# Patient Record
Sex: FEMALE | Race: WHITE | Employment: UNEMPLOYED | URBAN - METROPOLITAN AREA
[De-identification: names, ages, dates, MRNs, and addresses within clinical notes are randomized per-mention and may not be internally consistent; named-entity substitution may affect disease eponyms.]

---

## 2019-09-19 ENCOUNTER — APPOINTMENT (OUTPATIENT)
Dept: GENERAL RADIOLOGY | Age: 54
End: 2019-09-19
Attending: PHYSICIAN ASSISTANT
Payer: COMMERCIAL

## 2019-09-19 ENCOUNTER — HOSPITAL ENCOUNTER (EMERGENCY)
Age: 54
Discharge: HOME | End: 2019-09-19
Attending: EMERGENCY MEDICINE
Payer: COMMERCIAL

## 2019-09-19 ENCOUNTER — OFFICE VISIT (OUTPATIENT)
Dept: ORTHOPEDIC SURGERY | Age: 54
End: 2019-09-19

## 2019-09-19 VITALS
DIASTOLIC BLOOD PRESSURE: 49 MMHG | OXYGEN SATURATION: 99 % | WEIGHT: 260 LBS | HEIGHT: 67 IN | BODY MASS INDEX: 40.81 KG/M2 | RESPIRATION RATE: 18 BRPM | SYSTOLIC BLOOD PRESSURE: 116 MMHG | HEART RATE: 59 BPM | TEMPERATURE: 98.9 F

## 2019-09-19 VITALS
BODY MASS INDEX: 41.44 KG/M2 | HEIGHT: 67 IN | RESPIRATION RATE: 16 BRPM | SYSTOLIC BLOOD PRESSURE: 189 MMHG | HEART RATE: 79 BPM | TEMPERATURE: 97.8 F | WEIGHT: 264 LBS | OXYGEN SATURATION: 94 % | DIASTOLIC BLOOD PRESSURE: 93 MMHG

## 2019-09-19 DIAGNOSIS — S52.522A CLOSED TORUS FRACTURE OF DISTAL END OF LEFT RADIUS, INITIAL ENCOUNTER: Primary | ICD-10-CM

## 2019-09-19 DIAGNOSIS — S52.601A CLOSED FRACTURE OF DISTAL END OF RIGHT ULNA, UNSPECIFIED FRACTURE MORPHOLOGY, INITIAL ENCOUNTER: ICD-10-CM

## 2019-09-19 DIAGNOSIS — E66.01 OBESITY, MORBID (HCC): ICD-10-CM

## 2019-09-19 DIAGNOSIS — S52.532A CLOSED COLLES' FRACTURE OF LEFT RADIUS, INITIAL ENCOUNTER: Primary | ICD-10-CM

## 2019-09-19 PROCEDURE — 99284 EMERGENCY DEPT VISIT MOD MDM: CPT

## 2019-09-19 PROCEDURE — 74011250636 HC RX REV CODE- 250/636: Performed by: PHYSICIAN ASSISTANT

## 2019-09-19 PROCEDURE — 90471 IMMUNIZATION ADMIN: CPT

## 2019-09-19 PROCEDURE — A4565 SLINGS: HCPCS

## 2019-09-19 PROCEDURE — 74011250637 HC RX REV CODE- 250/637: Performed by: PHYSICIAN ASSISTANT

## 2019-09-19 PROCEDURE — 73110 X-RAY EXAM OF WRIST: CPT

## 2019-09-19 PROCEDURE — 90715 TDAP VACCINE 7 YRS/> IM: CPT | Performed by: PHYSICIAN ASSISTANT

## 2019-09-19 PROCEDURE — 75810000053 HC SPLINT APPLICATION

## 2019-09-19 RX ORDER — ONDANSETRON 4 MG/1
4 TABLET, ORALLY DISINTEGRATING ORAL
Status: COMPLETED | OUTPATIENT
Start: 2019-09-19 | End: 2019-09-19

## 2019-09-19 RX ORDER — HYDROCODONE BITARTRATE AND ACETAMINOPHEN 5; 325 MG/1; MG/1
1 TABLET ORAL
Qty: 12 TAB | Refills: 0 | Status: SHIPPED | OUTPATIENT
Start: 2019-09-19 | End: 2019-09-22

## 2019-09-19 RX ORDER — HYDROCODONE BITARTRATE AND ACETAMINOPHEN 5; 325 MG/1; MG/1
1 TABLET ORAL
Status: COMPLETED | OUTPATIENT
Start: 2019-09-19 | End: 2019-09-19

## 2019-09-19 RX ORDER — ONDANSETRON 4 MG/1
4 TABLET, ORALLY DISINTEGRATING ORAL
Qty: 12 TAB | Refills: 0 | Status: SHIPPED | OUTPATIENT
Start: 2019-09-19 | End: 2022-07-25

## 2019-09-19 RX ADMIN — HYDROCODONE BITARTRATE AND ACETAMINOPHEN 1 TABLET: 5; 325 TABLET ORAL at 15:30

## 2019-09-19 RX ADMIN — TETANUS TOXOID, REDUCED DIPHTHERIA TOXOID AND ACELLULAR PERTUSSIS VACCINE, ADSORBED 0.5 ML: 5; 2.5; 8; 8; 2.5 SUSPENSION INTRAMUSCULAR at 15:29

## 2019-09-19 RX ADMIN — ONDANSETRON 4 MG: 4 TABLET, ORALLY DISINTEGRATING ORAL at 15:30

## 2019-09-19 NOTE — PROGRESS NOTES
Aranza Minor is a 53 y.o. female right handed individual, not currently working. Worker's Compensation and legal considerations: not known. Vitals:    09/19/19 1643   BP: (!) 189/93   Pulse: 79   Resp: 16   Temp: 97.8 °F (36.6 °C)   TempSrc: Oral   SpO2: 94%   Weight: 264 lb (119.7 kg)   Height: 5' 7\" (1.702 m)   PainSc:  10 - Worst pain ever   PainLoc: Wrist           Chief Complaint   Patient presents with    Wrist Pain     left wrist pain         HPI: Pt comes in today after falling this afternoon and injuring her left wrist.  She was splinted in ED and referred here    Date of onset:  9/19/2019    Injury: Yes: Comment: fall    Prior Treatment:  Yes: Comment: ED Splint    Numbness/ Tingling: No    ROS: Review of Systems - General ROS: negative  Respiratory ROS: no cough, shortness of breath, or wheezing  Cardiovascular ROS: no chest pain or dyspnea on exertion  Musculoskeletal ROS: positive for - pain in wrist - left  Neurological ROS: negative  Dermatological ROS: negative    History reviewed. No pertinent past medical history. History reviewed. No pertinent surgical history. Current Outpatient Medications   Medication Sig Dispense Refill    HYDROcodone-acetaminophen (NORCO) 5-325 mg per tablet Take 1 Tab by mouth every four (4) hours as needed for Pain for up to 3 days. Max Daily Amount: 6 Tabs. 12 Tab 0    ondansetron (ZOFRAN ODT) 4 mg disintegrating tablet Take 1 Tab by mouth every eight (8) hours as needed for Nausea. 12 Tab 0       No Known Allergies      PE:     Left Wrist:  Splint intact. Sensation intact. Cap refill brisk. Imaging:     ED Plain Films show displaced Left DR Fracture        ICD-10-CM ICD-9-CM    1. Closed Colles' fracture of left radius, initial encounter S52.532A 813.41    2.  Obesity, morbid ([facility] [address] E66.01 278.01        Plan:     ORIF Left Distal Radius on 9/24/2019    F/U 2 weeks PO    General and Block    Plan was reviewed with patient, who verbalized agreement and understanding of the plan

## 2019-09-19 NOTE — DISCHARGE INSTRUCTIONS
Patient Education        Broken Arm: Care Instructions  Your Care Instructions  Fractures can range from a small, hairline crack, to a bone or bones broken into two or more pieces. Your treatment depends on how bad the break is. Your doctor may have put your arm in a splint or cast to allow it to heal or to keep it stable until you see another doctor. It may take weeks or months for your arm to heal. You can help your arm heal with some care at home. You heal best when you take good care of yourself. Eat a variety of healthy foods, and don't smoke. You may have had a sedative to help you relax. You may be unsteady after having sedation. It can take a few hours for the medicine's effects to wear off. Common side effects of sedation include nausea, vomiting, and feeling sleepy or tired. The doctor has checked you carefully, but problems can develop later. If you notice any problems or new symptoms, get medical treatment right away. Follow-up care is a key part of your treatment and safety. Be sure to make and go to all appointments, and call your doctor if you are having problems. It's also a good idea to know your test results and keep a list of the medicines you take. How can you care for yourself at home? · If the doctor gave you a sedative:  ? For 24 hours, don't do anything that requires attention to detail, such as going to work, making important decisions, or signing any legal documents. It takes time for the medicine's effects to completely wear off.  ? For your safety, do not drive or operate any machinery that could be dangerous. Wait until the medicine wears off and you can think clearly and react easily. · Put ice or a cold pack on your arm for 10 to 20 minutes at a time. Try to do this every 1 to 2 hours for the next 3 days (when you are awake). Put a thin cloth between the ice and your cast or splint. Keep the cast or splint dry. · Follow the cast care instructions your doctor gives you.  If you have a splint, do not take it off unless your doctor tells you to. · Be safe with medicines. Take pain medicines exactly as directed. ? If the doctor gave you a prescription medicine for pain, take it as prescribed. ? If you are not taking a prescription pain medicine, ask your doctor if you can take an over-the-counter medicine. · Prop up your arm on pillows when you sit or lie down in the first few days after the injury. Keep the arm higher than the level of your heart. This will help reduce swelling. · Follow instructions for exercises to keep your arm strong. · Wiggle your fingers and wrist often to reduce swelling and stiffness. When should you call for help? Call 911 anytime you think you may need emergency care. For example, call if:    · You are very sleepy and you have trouble waking up.    Call your doctor now or seek immediate medical care if:    · You have new or worse nausea or vomiting.     · You have new or worse pain.     · Your hand or fingers are cool or pale or change color.     · Your cast or splint feels too tight.     · You have tingling, weakness, or numbness in your hand or fingers.    Watch closely for changes in your health, and be sure to contact your doctor if:    · You do not get better as expected.     · You have problems with your cast or splint. Where can you learn more? Go to http://tiffanie-audra.info/. Enter Z382 in the search box to learn more about \"Broken Arm: Care Instructions. \"  Current as of: September 20, 2018  Content Version: 12.1  © 4381-7075 [employer]. Care instructions adapted under license by [employer] (which disclaims liability or warranty for this information). If you have questions about a medical condition or this instruction, always ask your healthcare professional. [address] any warranty or liability for your use of this information.

## 2019-09-19 NOTE — PROGRESS NOTES
1. Have you been to the ER, urgent care clinic since your last visit? Hospitalized since your last visit? No    2. Have you seen or consulted any other health care providers outside of the [address] [place] [address] since your last visit? Include any pap smears or colon screening.  No

## 2019-09-19 NOTE — ED PROVIDER NOTES
EMERGENCY DEPARTMENT HISTORY AND PHYSICAL EXAM    Date: 9/19/2019  Patient Name: Frances Moser    History of Presenting Illness     Chief Complaint   Patient presents with    Wrist Pain    Leg Pain         History Provided By: Patient        Additional History (Context): Frances Moser is a 53 y.o. female with No significant past medical history who presents with a history of mechanical fall with fall on outstretched arms complains of 7 out of 10, moderate left wrist pain, a mild to moderate left shin bruise and a knee abrasion status post this fall. Patient specifically denies syncope. Patient tripped and then fell. Patient denies head neck or back injury no loss of consciousness. Patient denies numbness, weakness or paresthesias of the extremities. PCP: None    Current Outpatient Medications   Medication Sig Dispense Refill    HYDROcodone-acetaminophen (NORCO) 5-325 mg per tablet Take 1 Tab by mouth every four (4) hours as needed for Pain for up to 3 days. Max Daily Amount: 6 Tabs. 12 Tab 0    ondansetron (ZOFRAN ODT) 4 mg disintegrating tablet Take 1 Tab by mouth every eight (8) hours as needed for Nausea. 12 Tab 0       Past History     Past Medical History:  History reviewed. No pertinent past medical history. Past Surgical History:  History reviewed. No pertinent surgical history. Family History:  History reviewed. No pertinent family history. Social History:  Social History     Tobacco Use    Smoking status: Current Every Day Smoker    Smokeless tobacco: Never Used   Substance Use Topics    Alcohol use: Not on file    Drug use: Not on file       Allergies:  No Known Allergies      Review of Systems   Review of Systems  ROS  Review of Systems   Constitutional: Negative for fatigue and fever. HENT: Negative for congestion. Respiratory: Negative for cough and shortness of breath. Cardiovascular: Negative for chest pain.     Musculoskeletal: Positive for left wrist joint pain, with joint swelling, recent injury as described in HPI. Skin: Left knee skin abrasion. .   Neurological: Negative for dizziness and headaches. All other systems reviewed and are negative. All Other Systems Negative  Physical Exam     Vitals:    09/19/19 1513   BP: 116/49   Pulse: (!) 59   Resp: 18   Temp: 98.9 °F (37.2 °C)   SpO2: 99%   Weight: 117.9 kg (260 lb)   Height: 5' 7\" (1.702 m)     Physical Exam     Constitutional: Pt is oriented to person, place, and time. Pt appears well-developed and well-nourished. HENT:   Head: Normocephalic and atraumatic. Mouth/Throat: Oropharynx is clear and moist.   Eyes: Pupils are equal, round, and reactive to light. Neck: Normal range of motion. Neck supple. No tracheal deviation present. No thyromegaly present. Cardiovascular: Normal rate, regular rhythm and normal heart sounds. No murmur heard. Pulmonary/Chest: Effort normal and breath sounds normal. No respiratory distress. No wheezes or rales. Musculoskeletal: Range of motion deferred secondary to obvious deformity of the distal radius or ulna. Cap refill is intact less than 2 seconds pulses intact patient denies snuffbox tenderness. Neurological: Pt is alert and oriented to person, place, and time   Skin: There is a large hematoma on the left shin anteriorly, and a superficial abrasion of the left knee present. Psychiatric: Pt has a normal mood and affect;  behavior is normal. Judgment and thought content normal.           Diagnostic Study Results     Labs -   No results found for this or any previous visit (from the past 12 hour(s)). Radiologic Studies -   XR WRIST LT AP/LAT/OBL MIN 3V   Final Result   IMPRESSION:      1. Acute distal radial intraarticular apex palmar mildly angulated fracture   with mild dorsal tilt. 2.  Acute ulnar styloid fracture.                CT Results  (Last 48 hours)    None        CXR Results  (Last 48 hours)    None            Medical Decision Making   I am the first provider for this patient. I reviewed the vital signs, available nursing notes, past medical history, past surgical history, family history and social history. Vital Signs-Reviewed the patient's vital signs. Comparison:    Records Reviewed: Nursing Notes    Procedures:  Procedures    Provider Notes (Medical Decision Making):   Vascular intact status post sugar tong splint placement of sling as well. MED RECONCILIATION:  No current facility-administered medications for this encounter. Current Outpatient Medications   Medication Sig    HYDROcodone-acetaminophen (NORCO) 5-325 mg per tablet Take 1 Tab by mouth every four (4) hours as needed for Pain for up to 3 days. Max Daily Amount: 6 Tabs.  ondansetron (ZOFRAN ODT) 4 mg disintegrating tablet Take 1 Tab by mouth every eight (8) hours as needed for Nausea. Disposition:  Home    DISCHARGE NOTE:   Patient seen, examined and discharged from triage. Patient has no other complaints, changes, or physical findings. Care plan outlined and precautions discussed. Results of exam were reviewed with the patient. All medications were reviewed with the patient; will d/c home with follow up instructions. All of pt's questions and concerns were addressed. Patient was instructed and agrees to follow up with primary care, as well as to return to the ED upon further deterioration. Patient is ready to go home.       Follow-up Information     Follow up With Specialties Details Why [address] [place] [id number]  [phone] [address] [facility] EMERGENCY DEPT Emergency Medicine  If symptoms worsen [address] [facility]  [phone]          Current Discharge Medication List      START taking these medications    Details   HYDROcodone-acetaminophen (NORCO) 5-325 mg per tablet Take 1 Tab by mouth every four (4) hours as needed for Pain for up to 3 days. Max Daily Amount: 6 Tabs. Qty: 12 Tab, Refills: 0    Associated Diagnoses: Closed torus fracture of distal end of left radius, initial encounter; Closed fracture of distal end of right ulna, unspecified fracture morphology, initial encounter      ondansetron (ZOFRAN ODT) 4 mg disintegrating tablet Take 1 Tab by mouth every eight (8) hours as needed for Nausea. Qty: 12 Tab, Refills: 0                 Diagnosis     Clinical Impression:   1. Closed torus fracture of distal end of left radius, initial encounter    2.  Closed fracture of distal end of right ulna, unspecified fracture morphology, initial encounter

## 2019-09-20 ENCOUNTER — TELEPHONE (OUTPATIENT)
Dept: ORTHOPEDIC SURGERY | Age: 54
End: 2019-09-20

## 2019-09-20 ENCOUNTER — HOSPITAL ENCOUNTER (OUTPATIENT)
Dept: LAB | Age: 54
Discharge: HOME | End: 2019-09-20
Payer: COMMERCIAL

## 2019-09-20 DIAGNOSIS — Z01.818 PREOP EXAMINATION: Primary | ICD-10-CM

## 2019-09-20 DIAGNOSIS — Z01.818 PREOP EXAMINATION: ICD-10-CM

## 2019-09-20 LAB
ALBUMIN SERPL-MCNC: 3.7 G/DL (ref 3.4–5)
ALBUMIN/GLOB SERPL: 1.2 {RATIO} (ref 0.8–1.7)
ALP SERPL-CCNC: 119 U/L (ref 45–117)
ALT SERPL-CCNC: 23 U/L (ref 13–56)
ANION GAP SERPL CALC-SCNC: 9 MMOL/L (ref 3–18)
AST SERPL-CCNC: 11 U/L (ref 10–38)
BASOPHILS # BLD: 0 K/UL (ref 0–0.1)
BASOPHILS NFR BLD: 0 % (ref 0–2)
BILIRUB SERPL-MCNC: 0.5 MG/DL (ref 0.2–1)
BUN SERPL-MCNC: 21 MG/DL (ref 7–18)
BUN/CREAT SERPL: 30 (ref 12–20)
CALCIUM SERPL-MCNC: 8.7 MG/DL (ref 8.5–10.1)
CHLORIDE SERPL-SCNC: 111 MMOL/L (ref 100–111)
CO2 SERPL-SCNC: 24 MMOL/L (ref 21–32)
CREAT SERPL-MCNC: 0.7 MG/DL (ref 0.6–1.3)
DIFFERENTIAL METHOD BLD: ABNORMAL
EOSINOPHIL # BLD: 0.2 K/UL (ref 0–0.4)
EOSINOPHIL NFR BLD: 2 % (ref 0–5)
ERYTHROCYTE [DISTWIDTH] IN BLOOD BY AUTOMATED COUNT: 14.6 % (ref 11.6–14.5)
GLOBULIN SER CALC-MCNC: 3 G/DL (ref 2–4)
GLUCOSE SERPL-MCNC: 109 MG/DL (ref 74–99)
HCT VFR BLD AUTO: 41.1 % (ref 35–45)
HGB BLD-MCNC: 13.4 G/DL (ref 12–16)
LYMPHOCYTES # BLD: 3.8 K/UL (ref 0.9–3.6)
LYMPHOCYTES NFR BLD: 33 % (ref 21–52)
MCH RBC QN AUTO: 30.4 PG (ref 24–34)
MCHC RBC AUTO-ENTMCNC: 32.6 G/DL (ref 31–37)
MCV RBC AUTO: 93.2 FL (ref 74–97)
MONOCYTES # BLD: 1.1 K/UL (ref 0.05–1.2)
MONOCYTES NFR BLD: 9 % (ref 3–10)
NEUTS SEG # BLD: 6.3 K/UL (ref 1.8–8)
NEUTS SEG NFR BLD: 56 % (ref 40–73)
PLATELET # BLD AUTO: 377 K/UL (ref 135–420)
PMV BLD AUTO: 10.9 FL (ref 9.2–11.8)
POTASSIUM SERPL-SCNC: 3.9 MMOL/L (ref 3.5–5.5)
PROT SERPL-MCNC: 6.7 G/DL (ref 6.4–8.2)
RBC # BLD AUTO: 4.41 M/UL (ref 4.2–5.3)
SODIUM SERPL-SCNC: 144 MMOL/L (ref 136–145)
WBC # BLD AUTO: 11.3 K/UL (ref 4.6–13.2)

## 2019-09-20 PROCEDURE — 85025 COMPLETE CBC W/AUTO DIFF WBC: CPT

## 2019-09-20 PROCEDURE — 36415 COLL VENOUS BLD VENIPUNCTURE: CPT

## 2019-09-20 PROCEDURE — 93005 ELECTROCARDIOGRAM TRACING: CPT

## 2019-09-20 PROCEDURE — 80053 COMPREHEN METABOLIC PANEL: CPT

## 2019-09-20 RX ORDER — ALPRAZOLAM 0.5 MG/1
0.5 TABLET ORAL
COMMUNITY

## 2019-09-20 NOTE — TELEPHONE ENCOUNTER
Tried to call patient, no answer please let her know Dr. Thomas Cervantes message if she calls back.

## 2019-09-20 NOTE — TELEPHONE ENCOUNTER
Called patient to set up surgery. She states that the ED gave her 3 days worth of pain medication which will run out on Sunday night. She is wanting to know if she can get a prescription to last until surgery Tuesday. If approved, patient would like to  the rx at [facility], as she will be in the area today to get her labs and her mother has an appt with Dr. Susy Antonio. Maybe JR could sign it? Please advise.

## 2019-09-21 LAB
ATRIAL RATE: 81 BPM
CALCULATED P AXIS, ECG09: 59 DEGREES
CALCULATED R AXIS, ECG10: 55 DEGREES
CALCULATED T AXIS, ECG11: 64 DEGREES
DIAGNOSIS, 93000: NORMAL
P-R INTERVAL, ECG05: 122 MS
Q-T INTERVAL, ECG07: 386 MS
QRS DURATION, ECG06: 76 MS
QTC CALCULATION (BEZET), ECG08: 448 MS
VENTRICULAR RATE, ECG03: 81 BPM

## 2019-09-23 ENCOUNTER — ANESTHESIA EVENT (OUTPATIENT)
Dept: SURGERY | Age: 54
End: 2019-09-23
Payer: COMMERCIAL

## 2019-09-24 ENCOUNTER — HOSPITAL ENCOUNTER (OUTPATIENT)
Age: 54
Setting detail: OUTPATIENT SURGERY
Discharge: HOME | End: 2019-09-24
Attending: ORTHOPAEDIC SURGERY | Admitting: ORTHOPAEDIC SURGERY
Payer: COMMERCIAL

## 2019-09-24 ENCOUNTER — ANESTHESIA (OUTPATIENT)
Dept: SURGERY | Age: 54
End: 2019-09-24
Payer: COMMERCIAL

## 2019-09-24 VITALS
SYSTOLIC BLOOD PRESSURE: 111 MMHG | BODY MASS INDEX: 40.81 KG/M2 | HEART RATE: 90 BPM | HEIGHT: 67 IN | RESPIRATION RATE: 18 BRPM | WEIGHT: 260 LBS | TEMPERATURE: 97 F | DIASTOLIC BLOOD PRESSURE: 76 MMHG | OXYGEN SATURATION: 96 %

## 2019-09-24 DIAGNOSIS — S52.572D OTHER CLOSED INTRA-ARTICULAR FRACTURE OF DISTAL END OF LEFT RADIUS WITH ROUTINE HEALING, SUBSEQUENT ENCOUNTER: Primary | ICD-10-CM

## 2019-09-24 PROCEDURE — 76060000034 HC ANESTHESIA 1.5 TO 2 HR: Performed by: ORTHOPAEDIC SURGERY

## 2019-09-24 PROCEDURE — 77030027138 HC INCENT SPIROMETER -A: Performed by: ORTHOPAEDIC SURGERY

## 2019-09-24 PROCEDURE — C1713 ANCHOR/SCREW BN/BN,TIS/BN: HCPCS | Performed by: ORTHOPAEDIC SURGERY

## 2019-09-24 PROCEDURE — 77030008829 HC WRE K ACMD -B: Performed by: ORTHOPAEDIC SURGERY

## 2019-09-24 PROCEDURE — 64450 NJX AA&/STRD OTHER PN/BRANCH: CPT | Performed by: ANESTHESIOLOGY

## 2019-09-24 PROCEDURE — 74011250636 HC RX REV CODE- 250/636: Performed by: ORTHOPAEDIC SURGERY

## 2019-09-24 PROCEDURE — 74011250636 HC RX REV CODE- 250/636: Performed by: NURSE ANESTHETIST, CERTIFIED REGISTERED

## 2019-09-24 PROCEDURE — 77030031139 HC SUT VCRL2 J&J -A: Performed by: ORTHOPAEDIC SURGERY

## 2019-09-24 PROCEDURE — 74011250636 HC RX REV CODE- 250/636

## 2019-09-24 PROCEDURE — 77030040356 HC CORD BPLR FRCP COVD -A: Performed by: ORTHOPAEDIC SURGERY

## 2019-09-24 PROCEDURE — L3650 SO 8 ABD RESTRAINT PRE OTS: HCPCS | Performed by: ORTHOPAEDIC SURGERY

## 2019-09-24 PROCEDURE — 76010000153 HC OR TIME 1.5 TO 2 HR: Performed by: ORTHOPAEDIC SURGERY

## 2019-09-24 PROCEDURE — 76210000020 HC REC RM PH II FIRST 0.5 HR: Performed by: ORTHOPAEDIC SURGERY

## 2019-09-24 PROCEDURE — 76942 ECHO GUIDE FOR BIOPSY: CPT | Performed by: ANESTHESIOLOGY

## 2019-09-24 PROCEDURE — 77030003601 HC NDL NRV BLK BBMI -A: Performed by: ORTHOPAEDIC SURGERY

## 2019-09-24 PROCEDURE — 77030002933 HC SUT MCRYL J&J -A: Performed by: ORTHOPAEDIC SURGERY

## 2019-09-24 PROCEDURE — 76210000016 HC OR PH I REC 1 TO 1.5 HR: Performed by: ORTHOPAEDIC SURGERY

## 2019-09-24 PROCEDURE — 74011000250 HC RX REV CODE- 250: Performed by: NURSE ANESTHETIST, CERTIFIED REGISTERED

## 2019-09-24 PROCEDURE — 74011000250 HC RX REV CODE- 250

## 2019-09-24 PROCEDURE — 77030018836 HC SOL IRR NACL ICUM -A: Performed by: ORTHOPAEDIC SURGERY

## 2019-09-24 PROCEDURE — 74011250637 HC RX REV CODE- 250/637: Performed by: NURSE ANESTHETIST, CERTIFIED REGISTERED

## 2019-09-24 PROCEDURE — 77030040361 HC SLV COMPR DVT MDII -B: Performed by: ORTHOPAEDIC SURGERY

## 2019-09-24 PROCEDURE — 77030003737 HC BIT DRL ACMD -C: Performed by: ORTHOPAEDIC SURGERY

## 2019-09-24 PROCEDURE — 77030003736 HC BIT DRL ACMD -B: Performed by: ORTHOPAEDIC SURGERY

## 2019-09-24 DEVICE — 2.3MM X 22MM LOCKING CORTICAL SCREW
Type: IMPLANTABLE DEVICE | Site: ARM | Status: FUNCTIONAL
Brand: ACUMED

## 2019-09-24 DEVICE — 2.3MM X 12MM LOCKING CORTICAL SCREW
Type: IMPLANTABLE DEVICE | Site: ARM | Status: FUNCTIONAL
Brand: ACUMED

## 2019-09-24 DEVICE — 3.5MM X 12MM NON-LOCKING HEXALOBE SCREW
Type: IMPLANTABLE DEVICE | Site: ARM | Status: FUNCTIONAL
Brand: ACUMED

## 2019-09-24 DEVICE — 3.5MM X 14MM LOCKING HEXALOBE SCREW
Type: IMPLANTABLE DEVICE | Site: ARM | Status: FUNCTIONAL
Brand: ACUMED

## 2019-09-24 DEVICE — 3.5MM X 12MM LOCKING HEXALOBE SCREW
Type: IMPLANTABLE DEVICE | Site: ARM | Status: FUNCTIONAL
Brand: ACUMED

## 2019-09-24 DEVICE — 2.3MM X 18MM LOCKING CORTICAL SCREW
Type: IMPLANTABLE DEVICE | Site: ARM | Status: FUNCTIONAL
Brand: ACUMED

## 2019-09-24 RX ORDER — HYDROMORPHONE HYDROCHLORIDE 2 MG/ML
0.5 INJECTION, SOLUTION INTRAMUSCULAR; INTRAVENOUS; SUBCUTANEOUS
Status: COMPLETED | OUTPATIENT
Start: 2019-09-24 | End: 2019-09-24

## 2019-09-24 RX ORDER — FENTANYL CITRATE 50 UG/ML
INJECTION, SOLUTION INTRAMUSCULAR; INTRAVENOUS AS NEEDED
Status: DISCONTINUED | OUTPATIENT
Start: 2019-09-24 | End: 2019-09-24 | Stop reason: HOSPADM

## 2019-09-24 RX ORDER — SUCCINYLCHOLINE CHLORIDE 20 MG/ML
INJECTION INTRAMUSCULAR; INTRAVENOUS AS NEEDED
Status: DISCONTINUED | OUTPATIENT
Start: 2019-09-24 | End: 2019-09-24 | Stop reason: HOSPADM

## 2019-09-24 RX ORDER — MIDAZOLAM HYDROCHLORIDE 1 MG/ML
2 INJECTION, SOLUTION INTRAMUSCULAR; INTRAVENOUS ONCE
Status: COMPLETED | OUTPATIENT
Start: 2019-09-24 | End: 2019-09-24

## 2019-09-24 RX ORDER — MIDAZOLAM HYDROCHLORIDE 1 MG/ML
INJECTION, SOLUTION INTRAMUSCULAR; INTRAVENOUS
Status: COMPLETED | OUTPATIENT
Start: 2019-09-24 | End: 2019-09-24

## 2019-09-24 RX ORDER — EPHEDRINE SULFATE/0.9% NACL/PF 25 MG/5 ML
SYRINGE (ML) INTRAVENOUS AS NEEDED
Status: DISCONTINUED | OUTPATIENT
Start: 2019-09-24 | End: 2019-09-24 | Stop reason: HOSPADM

## 2019-09-24 RX ORDER — KETOROLAC TROMETHAMINE 15 MG/ML
15 INJECTION, SOLUTION INTRAMUSCULAR; INTRAVENOUS
Status: COMPLETED | OUTPATIENT
Start: 2019-09-24 | End: 2019-09-24

## 2019-09-24 RX ORDER — SODIUM CHLORIDE 0.9 % (FLUSH) 0.9 %
5-40 SYRINGE (ML) INJECTION EVERY 8 HOURS
Status: DISCONTINUED | OUTPATIENT
Start: 2019-09-24 | End: 2019-09-24 | Stop reason: HOSPADM

## 2019-09-24 RX ORDER — SODIUM CHLORIDE, SODIUM LACTATE, POTASSIUM CHLORIDE, CALCIUM CHLORIDE 600; 310; 30; 20 MG/100ML; MG/100ML; MG/100ML; MG/100ML
50 INJECTION, SOLUTION INTRAVENOUS CONTINUOUS
Status: DISCONTINUED | OUTPATIENT
Start: 2019-09-24 | End: 2019-09-24 | Stop reason: HOSPADM

## 2019-09-24 RX ORDER — SODIUM CHLORIDE 0.9 % (FLUSH) 0.9 %
5-40 SYRINGE (ML) INJECTION AS NEEDED
Status: DISCONTINUED | OUTPATIENT
Start: 2019-09-24 | End: 2019-09-24 | Stop reason: HOSPADM

## 2019-09-24 RX ORDER — SODIUM CHLORIDE, SODIUM LACTATE, POTASSIUM CHLORIDE, CALCIUM CHLORIDE 600; 310; 30; 20 MG/100ML; MG/100ML; MG/100ML; MG/100ML
75 INJECTION, SOLUTION INTRAVENOUS CONTINUOUS
Status: DISCONTINUED | OUTPATIENT
Start: 2019-09-24 | End: 2019-09-24 | Stop reason: HOSPADM

## 2019-09-24 RX ORDER — ROPIVACAINE HYDROCHLORIDE 5 MG/ML
30 INJECTION, SOLUTION EPIDURAL; INFILTRATION; PERINEURAL
Status: COMPLETED | OUTPATIENT
Start: 2019-09-24 | End: 2019-09-24

## 2019-09-24 RX ORDER — ONDANSETRON 2 MG/ML
INJECTION INTRAMUSCULAR; INTRAVENOUS AS NEEDED
Status: DISCONTINUED | OUTPATIENT
Start: 2019-09-24 | End: 2019-09-24 | Stop reason: HOSPADM

## 2019-09-24 RX ORDER — ONDANSETRON 2 MG/ML
4 INJECTION INTRAMUSCULAR; INTRAVENOUS
Status: DISCONTINUED | OUTPATIENT
Start: 2019-09-24 | End: 2019-09-24 | Stop reason: HOSPADM

## 2019-09-24 RX ORDER — HYDROCODONE BITARTRATE AND ACETAMINOPHEN 5; 325 MG/1; MG/1
1 TABLET ORAL AS NEEDED
Status: DISCONTINUED | OUTPATIENT
Start: 2019-09-24 | End: 2019-09-24 | Stop reason: HOSPADM

## 2019-09-24 RX ORDER — PROPOFOL 10 MG/ML
INJECTION, EMULSION INTRAVENOUS AS NEEDED
Status: DISCONTINUED | OUTPATIENT
Start: 2019-09-24 | End: 2019-09-24 | Stop reason: HOSPADM

## 2019-09-24 RX ORDER — FENTANYL CITRATE 50 UG/ML
INJECTION, SOLUTION INTRAMUSCULAR; INTRAVENOUS
Status: COMPLETED | OUTPATIENT
Start: 2019-09-24 | End: 2019-09-24

## 2019-09-24 RX ORDER — ROPIVACAINE HYDROCHLORIDE 5 MG/ML
INJECTION, SOLUTION EPIDURAL; INFILTRATION; PERINEURAL
Status: COMPLETED | OUTPATIENT
Start: 2019-09-24 | End: 2019-09-24

## 2019-09-24 RX ORDER — IBUPROFEN 800 MG/1
800 TABLET ORAL
Qty: 30 TAB | Refills: 1 | Status: SHIPPED | OUTPATIENT
Start: 2019-09-24 | End: 2019-10-04

## 2019-09-24 RX ORDER — KETOROLAC TROMETHAMINE 30 MG/ML
30 INJECTION, SOLUTION INTRAMUSCULAR; INTRAVENOUS
Status: DISCONTINUED | OUTPATIENT
Start: 2019-09-24 | End: 2019-09-24

## 2019-09-24 RX ORDER — CEFAZOLIN SODIUM 2 G/50ML
2 SOLUTION INTRAVENOUS ONCE
Status: COMPLETED | OUTPATIENT
Start: 2019-09-24 | End: 2019-09-24

## 2019-09-24 RX ORDER — LIDOCAINE HYDROCHLORIDE 20 MG/ML
INJECTION, SOLUTION EPIDURAL; INFILTRATION; INTRACAUDAL; PERINEURAL AS NEEDED
Status: DISCONTINUED | OUTPATIENT
Start: 2019-09-24 | End: 2019-09-24 | Stop reason: HOSPADM

## 2019-09-24 RX ORDER — OXYCODONE AND ACETAMINOPHEN 7.5; 325 MG/1; MG/1
1 TABLET ORAL
Qty: 30 TAB | Refills: 0 | Status: SHIPPED | OUTPATIENT
Start: 2019-09-24 | End: 2019-09-29

## 2019-09-24 RX ORDER — FAMOTIDINE 20 MG/1
20 TABLET, FILM COATED ORAL ONCE
Status: COMPLETED | OUTPATIENT
Start: 2019-09-24 | End: 2019-09-24

## 2019-09-24 RX ORDER — ROCURONIUM BROMIDE 10 MG/ML
INJECTION, SOLUTION INTRAVENOUS AS NEEDED
Status: DISCONTINUED | OUTPATIENT
Start: 2019-09-24 | End: 2019-09-24 | Stop reason: HOSPADM

## 2019-09-24 RX ORDER — KETOROLAC TROMETHAMINE 15 MG/ML
INJECTION, SOLUTION INTRAMUSCULAR; INTRAVENOUS
Status: COMPLETED
Start: 2019-09-24 | End: 2019-09-24

## 2019-09-24 RX ORDER — DEXAMETHASONE SODIUM PHOSPHATE 4 MG/ML
INJECTION, SOLUTION INTRA-ARTICULAR; INTRALESIONAL; INTRAMUSCULAR; INTRAVENOUS; SOFT TISSUE AS NEEDED
Status: DISCONTINUED | OUTPATIENT
Start: 2019-09-24 | End: 2019-09-24 | Stop reason: HOSPADM

## 2019-09-24 RX ORDER — HYDROCODONE BITARTRATE AND ACETAMINOPHEN 5; 325 MG/1; MG/1
TABLET ORAL
COMMUNITY
End: 2019-09-24

## 2019-09-24 RX ORDER — FENTANYL CITRATE 50 UG/ML
100 INJECTION, SOLUTION INTRAMUSCULAR; INTRAVENOUS ONCE
Status: COMPLETED | OUTPATIENT
Start: 2019-09-24 | End: 2019-09-24

## 2019-09-24 RX ORDER — FENTANYL CITRATE 50 UG/ML
25 INJECTION, SOLUTION INTRAMUSCULAR; INTRAVENOUS AS NEEDED
Status: DISCONTINUED | OUTPATIENT
Start: 2019-09-24 | End: 2019-09-24 | Stop reason: HOSPADM

## 2019-09-24 RX ORDER — LIDOCAINE HYDROCHLORIDE 10 MG/ML
3 INJECTION, SOLUTION EPIDURAL; INFILTRATION; INTRACAUDAL; PERINEURAL ONCE
Status: COMPLETED | OUTPATIENT
Start: 2019-09-24 | End: 2019-09-24

## 2019-09-24 RX ADMIN — ONDANSETRON 4 MG: 2 INJECTION INTRAMUSCULAR; INTRAVENOUS at 13:50

## 2019-09-24 RX ADMIN — DEXAMETHASONE SODIUM PHOSPHATE 4 MG: 4 INJECTION, SOLUTION INTRA-ARTICULAR; INTRALESIONAL; INTRAMUSCULAR; INTRAVENOUS; SOFT TISSUE at 12:45

## 2019-09-24 RX ADMIN — LIDOCAINE HYDROCHLORIDE 2 ML: 10 INJECTION, SOLUTION EPIDURAL; INFILTRATION; INTRACAUDAL; PERINEURAL at 12:02

## 2019-09-24 RX ADMIN — ROPIVACAINE HYDROCHLORIDE 150 MG: 5 INJECTION, SOLUTION EPIDURAL; INFILTRATION; PERINEURAL at 12:01

## 2019-09-24 RX ADMIN — KETOROLAC TROMETHAMINE 15 MG: 15 INJECTION, SOLUTION INTRAMUSCULAR; INTRAVENOUS at 15:00

## 2019-09-24 RX ADMIN — MIDAZOLAM HYDROCHLORIDE 2 MG: 2 INJECTION, SOLUTION INTRAMUSCULAR; INTRAVENOUS at 12:01

## 2019-09-24 RX ADMIN — MIDAZOLAM HYDROCHLORIDE 2 MG: 1 INJECTION, SOLUTION INTRAMUSCULAR; INTRAVENOUS at 12:01

## 2019-09-24 RX ADMIN — ROPIVACAINE HYDROCHLORIDE 30 ML: 5 INJECTION, SOLUTION EPIDURAL; INFILTRATION; PERINEURAL at 12:01

## 2019-09-24 RX ADMIN — FENTANYL CITRATE 50 MCG: 50 INJECTION, SOLUTION INTRAMUSCULAR; INTRAVENOUS at 13:11

## 2019-09-24 RX ADMIN — FENTANYL CITRATE 25 MCG: 50 INJECTION INTRAMUSCULAR; INTRAVENOUS at 15:05

## 2019-09-24 RX ADMIN — FAMOTIDINE 20 MG: 20 TABLET ORAL at 11:46

## 2019-09-24 RX ADMIN — HYDROMORPHONE HYDROCHLORIDE 0.5 MG: 2 INJECTION INTRAMUSCULAR; INTRAVENOUS; SUBCUTANEOUS at 14:55

## 2019-09-24 RX ADMIN — LIDOCAINE HYDROCHLORIDE 100 MG: 20 INJECTION, SOLUTION EPIDURAL; INFILTRATION; INTRACAUDAL; PERINEURAL at 12:24

## 2019-09-24 RX ADMIN — SODIUM CHLORIDE, SODIUM LACTATE, POTASSIUM CHLORIDE, AND CALCIUM CHLORIDE 75 ML/HR: 600; 310; 30; 20 INJECTION, SOLUTION INTRAVENOUS at 11:46

## 2019-09-24 RX ADMIN — FENTANYL CITRATE 100 MCG: 50 INJECTION INTRAMUSCULAR; INTRAVENOUS at 12:01

## 2019-09-24 RX ADMIN — FENTANYL CITRATE 25 MCG: 50 INJECTION INTRAMUSCULAR; INTRAVENOUS at 15:18

## 2019-09-24 RX ADMIN — SUCCINYLCHOLINE CHLORIDE 140 MG: 20 INJECTION INTRAMUSCULAR; INTRAVENOUS at 12:24

## 2019-09-24 RX ADMIN — HYDROMORPHONE HYDROCHLORIDE 0.5 MG: 2 INJECTION INTRAMUSCULAR; INTRAVENOUS; SUBCUTANEOUS at 14:45

## 2019-09-24 RX ADMIN — HYDROMORPHONE HYDROCHLORIDE 0.5 MG: 2 INJECTION INTRAMUSCULAR; INTRAVENOUS; SUBCUTANEOUS at 14:25

## 2019-09-24 RX ADMIN — Medication 10 MG: at 12:35

## 2019-09-24 RX ADMIN — ROCURONIUM BROMIDE 15 MG: 10 INJECTION, SOLUTION INTRAVENOUS at 12:24

## 2019-09-24 RX ADMIN — PROPOFOL 150 MG: 10 INJECTION, EMULSION INTRAVENOUS at 12:24

## 2019-09-24 RX ADMIN — FENTANYL CITRATE 25 MCG: 50 INJECTION INTRAMUSCULAR; INTRAVENOUS at 15:00

## 2019-09-24 RX ADMIN — FENTANYL CITRATE 100 MCG: 50 INJECTION, SOLUTION INTRAMUSCULAR; INTRAVENOUS at 12:01

## 2019-09-24 RX ADMIN — FENTANYL CITRATE 50 MCG: 50 INJECTION, SOLUTION INTRAMUSCULAR; INTRAVENOUS at 12:20

## 2019-09-24 RX ADMIN — CEFAZOLIN 2 G: 10 INJECTION, POWDER, FOR SOLUTION INTRAVENOUS at 12:30

## 2019-09-24 RX ADMIN — HYDROMORPHONE HYDROCHLORIDE 0.5 MG: 2 INJECTION INTRAMUSCULAR; INTRAVENOUS; SUBCUTANEOUS at 14:35

## 2019-09-24 NOTE — ANESTHESIA POSTPROCEDURE EVALUATION
Procedure(s):  OPEN REDUCTION INTERNAL FIXATION LEFT DISTAL RADIUS. general, regional    Anesthesia Post Evaluation      Multimodal analgesia: multimodal analgesia used between 6 hours prior to anesthesia start to PACU discharge  Patient location during evaluation: bedside  Patient participation: complete - patient participated  Level of consciousness: awake  Pain management: adequate  Airway patency: patent  Anesthetic complications: no  Cardiovascular status: stable  Respiratory status: acceptable  Hydration status: acceptable  Post anesthesia nausea and vomiting:  controlled      Vitals Value Taken Time   /51 9/24/2019  3:26 PM   Temp 36.6 °C (97.9 °F) 9/24/2019  2:19 PM   Pulse 96 9/24/2019  3:31 PM   Resp 15 9/24/2019  3:31 PM   SpO2 95 % 9/24/2019  3:31 PM   Vitals shown include unvalidated device data.

## 2019-09-24 NOTE — BRIEF OP NOTE
BRIEF OPERATIVE NOTE    Date of Procedure: 9/24/2019   Preoperative Diagnosis: Closed Colles' fracture of left radius, initial encounter [S52.532A]  Postoperative Diagnosis: Closed Colles' fracture of left radius, initial encounter [S52.532A]    Procedure(s):  OPEN REDUCTION INTERNAL FIXATION LEFT DISTAL RADIUS  Surgeon(s) and Role:     Noemi Bloom DO - Primary         Surgical Assistant: Julia Morse    Surgical Staff:  Circ-1: Dereje Akbar RN  Circ-Relief: Jean Marie Kumar RN  Scrub Tech-1: Greg COSTA  Surg Asst-1: Ei Citizen  Event Time In Time Out   Incision Start 1249    Incision Close 1408      Anesthesia: General   Estimated Blood Loss: Minimal  Specimens: * No specimens in log *   Findings: Displaced Intra-articular Distal radius in 3 parts   Complications: tendon injury without laceration from drilling  Implants:   Implant Name Type Inv.  Item Serial No.  Lot No. LRB No. Used Action   Acu-Loc VDR Proximal Left Plate    "[employer] [id number] Left 1 Implanted

## 2019-09-24 NOTE — PROGRESS NOTES
Ketorolac 15mg iv was therapeutically interchanged for ketorolac 30mg iv per the P&T Committee approved Therapeutic Interchanges Policy.     Sara Lora, [facility], Pharmacist  9/24/2019 2:31 PM

## 2019-09-24 NOTE — ANESTHESIA PREPROCEDURE EVALUATION
Relevant Problems   No relevant active problems       Anesthetic History   No history of anesthetic complications            Review of Systems / Medical History  Patient summary reviewed and pertinent labs reviewed    Pulmonary  Within defined limits                 Neuro/Psych   Within defined limits           Cardiovascular  Within defined limits                Exercise tolerance: >4 METS     GI/Hepatic/Renal                Endo/Other        Morbid obesity     Other Findings              Physical Exam    Airway  Mallampati: III  TM Distance: 4 - 6 cm  Neck ROM: normal range of motion   Mouth opening: Normal     Cardiovascular    Rhythm: regular  Rate: normal         Dental  No notable dental hx       Pulmonary  Breath sounds clear to auscultation               Abdominal  GI exam deferred       Other Findings            Anesthetic Plan    ASA: 3  Anesthesia type: general and regional - supraclavicular block          Induction: Intravenous  Anesthetic plan and risks discussed with: Patient

## 2019-09-24 NOTE — ANESTHESIA PROCEDURE NOTES
Peripheral Block    Start time: 9/24/2019 11:55 AM  End time: 9/24/2019 12:02 PM  Performed by: Efren Pryor MD  Authorized by: Efren Pryor MD       Pre-procedure:    Indications: at surgeon's request and post-op pain management    Preanesthetic Checklist: patient identified, risks and benefits discussed, site marked, timeout performed, anesthesia consent given and patient being monitored    Timeout Time: 11:55          Block Type:   Block Type:  Supraclavicular  Laterality:  Left  Monitoring:  Standard ASA monitoring, responsive to questions, oxygen, continuous pulse ox, frequent vital sign checks and heart rate  Injection Technique:  Single shot  Procedures: ultrasound guided    Patient Position: supine  Prep: chlorhexidine    Location:  Supraclavicular  Needle Type:  Ultraplex  Needle Gauge:  22 G    Assessment:  Number of attempts:  1  Injection Assessment:  Incremental injection every 5 mL, negative aspiration for CSF, no paresthesia, ultrasound image on chart, low pressure verified by pressure monitor, no intravascular symptoms, negative aspiration for blood and local visualized surrounding nerve on ultrasound  Patient tolerance:  Patient tolerated the procedure well with no immediate complications

## 2019-09-24 NOTE — DISCHARGE INSTRUCTIONS
Patient Education     ICE AND ELEVATE    COVER WITH PLASTIC BAG FOR SHOWERING    KEEP SPLINT CLEAN AND DRY     Wearing a Fiberglass Cast: Care Instructions  Your Care Instructions    A cast protects a broken bone or other injury while it heals. Most casts are made of fiberglass. After a cast is put on, you can't remove it yourself. Your doctor or a technician will take it off. Follow-up care is a key part of your treatment and safety. Be sure to make and go to all appointments, and call your doctor if you are having problems. It's also a good idea to know your test results and keep a list of the medicines you take. How can you care for yourself at home? General care  · Follow your doctor's instructions for when you can start using the limb that has the cast. Fiberglass casts dry quickly and are soon hard enough to protect the injured arm or leg. · When it's okay to put weight on your leg or foot cast, don't stand or walk on it unless it's designed for walking. · Prop up the injured arm or leg on a pillow anytime you sit or lie down during the first 3 days. Try to keep it above the level of your heart. This will help reduce swelling. · Put ice or a cold pack on your cast for 10 to 20 minutes at a time. Try to do this every 1 to 2 hours for the next 3 days (when you are awake). Put a thin cloth between the ice and your cast. Keep the cast dry. · Be safe with medicines. Read and follow all instructions on the label. ? If the doctor gave you a prescription medicine for pain, take it as prescribed. ? If you are not taking a prescription pain medicine, ask your doctor if you can take an over-the-counter medicine. · Do exercises as instructed by your doctor or physical therapist. These exercises will help keep your muscles strong and your joints flexible while you heal.  · Wiggle your fingers or toes on the injured arm or leg often. This helps reduce swelling and stiffness.   Water and your cast  · Try to keep your cast as dry as you can. The fiberglass part of your cast can get wet. But getting the inside wet can cause problems. · Use a bag or tape a sheet of plastic to cover your cast when you take a shower or bath or when you have any other contact with water. (Don't take a bath unless you can keep the cast out of the water.) Moisture can collect under the cast and cause skin irritation and itching. It can make infection more likely if you have had surgery or have a wound under the cast.  · If you have a water-resistant cast, ask your doctor how often it can get wet and how to take care of it. Cast and skin care  · Try blowing cool air from a hair dryer or fan into the cast to help relieve itching. Never stick items under your cast to scratch the skin. · Don't use oils or lotions near your cast. If the skin gets red or irritated around the edge of the cast, you may pad the edges with a soft material or use tape to cover them. When should you call for help? Call your doctor now or seek immediate medical care if:    · You have increased or severe pain.     · You feel a warm or painful spot under the cast.     · You have problems with your cast. For example:  ? The skin under the cast burns or stings. ? The cast feels too tight or too loose. ? There is a lot of swelling near the cast. (Some swelling is normal.)  ? You have a new fever. ? There is drainage or a bad smell coming from the cast.     · Your foot or hand is cool or pale or changes color.     · You have trouble moving your fingers or toes.     · You have symptoms of a blood clot in your arm or leg (called a deep vein thrombosis). These may include:  ? Pain in the arm, calf, back of the knee, thigh, or groin. ? Redness and swelling in the arm, leg, or groin.    Watch closely for changes in your health, and be sure to contact your doctor if:    · The cast is breaking apart.     · You are not getting better as expected. Where can you learn more?   Go to http://tiffanie-audra.info/. Enter 454 [id number] in the search box to learn more about \"Wearing a Fiberglass Cast: Care Instructions. \"  Current as of: June 26, 2019  Content Version: 12.2  © 1723-4723 [employer]. Care instructions adapted under license by [employer] (which disclaims liability or warranty for this information). If you have questions about a medical condition or this instruction, always ask your healthcare professional. [address] any warranty or liability for your use of this information. DISCHARGE SUMMARY from Nurse    PATIENT INSTRUCTIONS:    After general anesthesia or intravenous sedation, for 24 hours or while taking prescription Narcotics:  · Limit your activities  · Do not drive and operate hazardous machinery  · Do not make important personal or business decisions  · Do  not drink alcoholic beverages  · If you have not urinated within 8 hours after discharge, please contact your surgeon on call. Report the following to your surgeon:  · Excessive pain, swelling, redness or odor of or around the surgical area  · Temperature over 100.5  · Nausea and vomiting lasting longer than 4 hours or if unable to take medications  · Any signs of decreased circulation or nerve impairment to extremity: change in color, persistent  numbness, tingling, coldness or increase pain  · Any questions    *  Please give a list of your current medications to your Primary Care Provider. *  Please update this list whenever your medications are discontinued, doses are      changed, or new medications (including over-the-counter products) are added. *  Please carry medication information at all times in case of emergency situations.     These are general instructions for a healthy lifestyle:    No smoking/ No tobacco products/ Avoid exposure to second hand smoke  Surgeon General's Warning:  Quitting smoking now greatly reduces serious risk to your health. Obesity, smoking, and sedentary lifestyle greatly increases your risk for illness    A healthy diet, regular physical exercise & weight monitoring are important for maintaining a healthy lifestyle    You may be retaining fluid if you have a history of heart failure or if you experience any of the following symptoms:  Weight gain of 3 pounds or more overnight or 5 pounds in a week, increased swelling in our hands or feet or shortness of breath while lying flat in bed. Please call your doctor as soon as you notice any of these symptoms; do not wait until your next office visit. The discharge information has been reviewed with the patient and spouse. The patient and spouse verbalized understanding. Discharge medications reviewed with the patient and spouse and appropriate educational materials and side effects teaching were provided.   ___________________________________________________________________________________________________________________________________

## 2019-09-24 NOTE — H&P
Radha Peralta is a 53 y.o. female right handed individual, not currently working. Worker's Compensation and legal considerations: not known.         Vitals:     09/19/19 1643   BP: (!) 189/93   Pulse: 79   Resp: 16   Temp: 97.8 °F (36.6 °C)   TempSrc: Oral   SpO2: 94%   Weight: 264 lb (119.7 kg)   Height: 5' 7\" (1.702 m)   PainSc:  10 - Worst pain ever   PainLoc: Wrist                    Chief Complaint   Patient presents with    Wrist Pain       left wrist pain            HPI: Pt comes in today after falling this afternoon and injuring her left wrist.  She was splinted in ED and referred here     Date of onset:  9/19/2019     Injury: Yes: Comment: fall     Prior Treatment:  Yes: Comment: ED Splint     Numbness/ Tingling: No     ROS: Review of Systems - General ROS: negative  Respiratory ROS: no cough, shortness of breath, or wheezing  Cardiovascular ROS: no chest pain or dyspnea on exertion  Musculoskeletal ROS: positive for - pain in wrist - left  Neurological ROS: negative  Dermatological ROS: negative     History reviewed. No pertinent past medical history.     Surgical History   History reviewed. No pertinent surgical history.               Current Outpatient Medications   Medication Sig Dispense Refill    HYDROcodone-acetaminophen (NORCO) 5-325 mg per tablet Take 1 Tab by mouth every four (4) hours as needed for Pain for up to 3 days. Max Daily Amount: 6 Tabs. 12 Tab 0    ondansetron (ZOFRAN ODT) 4 mg disintegrating tablet Take 1 Tab by mouth every eight (8) hours as needed for Nausea. 12 Tab 0         No Known Allergies        PE:     Physical Exam   Constitutional: She is oriented to person, place, and time and well-developed, well-nourished, and in no distress. No distress. HENT:   Head: Normocephalic and atraumatic. Eyes: Pupils are equal, round, and reactive to light. Neck: Normal range of motion. Neck supple. Cardiovascular: Intact distal pulses.    Pulmonary/Chest: Effort normal and breath sounds normal. No respiratory distress. Abdominal: Soft. She exhibits no distension. There is no tenderness. Musculoskeletal: She exhibits edema, tenderness and deformity. Neurological: She is alert and oriented to person, place, and time. Skin: Skin is warm. No rash noted. She is not diaphoretic. No erythema. No pallor. Psychiatric: Mood, memory, affect and judgment normal.        Left Wrist:  Splint intact. Sensation intact. Cap refill brisk.     Imaging:      ED Plain Films show displaced Left DR Fracture            ICD-10-CM ICD-9-CM     1. Closed Colles' fracture of left radius, initial encounter S52.532A 813.41     2.  Obesity, morbid ([facility]) E66.01 278.01           Plan:      ORIF Left Distal Radius on 9/24/2019     F/U 2 weeks PO     General and Block     Plan was reviewed with patient, who verbalized agreement and understanding of the plan

## 2019-09-25 NOTE — OP NOTES
[address] [place] [address]   OPERATIVE REPORT    Name:  Renetta Harrell  MR#:   [id number]  :  1965  ACCOUNT #:  [de-identified]  DATE OF SERVICE:  2019    PREOPERATIVE DIAGNOSIS:  Closed intra-articular distal radius fracture in 3+ fragments, left. POSTOPERATIVE DIAGNOSIS:  Closed intra-articular distal radius fracture in 3+ fragments, left. PROCEDURE PERFORMED:  Open reduction and internal fixation of left distal radius. SURGEON:  Lana Miramontes DO    ASSISTANT:  Daisy Cleary. ANESTHESIA:  General and supraclavicular block. COMPLICATIONS:  Tendon injury without laceration from the joint that has been determined to be acceptable without intervention. SPECIMENS REMOVED:  None. IMPLANTS:  Fixed angle volar locking plate and screws. ESTIMATED BLOOD LOSS:  Minimal.    FINDINGS:  Displaced intra-articular distal radius fracture in 3+ parts. Informed consent was obtained from the patient. The risks and benefits of the procedure were discussed with the patient. They include but are not limited to neurovascular injury, blood loss, malunion, nonunion, limb length discrepancy, chronic pain, need for further surgery, tendon injury, ligament injury as well as complications from anesthesia including death. PROCEDURE:  After informed consent was obtained from the patient, she was taken back to the operative suite. Prior to being taken to the operative suite, a supraclavicular block was given via anesthesia and was determined to be adequate prior and during the surgery. A tourniquet was applied. The left upper extremity and the arm was prepped and draped in the normal sterile fashion. The arm was exsanguinated, and the tourniquet was elevated to 250 mmHg. Incision was made over the volar wrist over the FCR tendon. The FCR tendon sheath was incised and retracted in the ulnar direction. After this, the FPL was retracted ulnarly after the floor of the FCR tendon sheath was incised.   The pronator quadratus was elevated off of the distal radius, and the fracture was identified. Using manipulation, the fracture was reduced and confirmed with fluoroscopy. The appropriate plate was placed onto the fracture, and its position was confirmed with fluoroscopy. Pins were placed distally and proximally to hold the plate in the proper position. Screws were drilled into the distal part of the plate first starting with a non-locking screw in the most ulnar distal hole. This was followed by a locking screw in the next hole radial.  At this point, it was determined that the non-locking screw ulnarly was too long and needed to be replaced. It was subsequently replaced with a shorter non-locking screw. After this, the most radial hole was drilled, and a screw was placed. The radial styloid screws were drilled. The proximal one was placed in appropriate position. The more distal screw was found to be prominent, and it was determined that it may have injured a first dorsal compartment tendon when the drill was pulled out. At this point, adequately short screws were placed into the radial styloid. Additionally, a final screw in the proximal aspect at the distal part of the plate was also placed. At this point, the pin was removed distally. A non-locking screw was drilled in the central hole of the shaft portion of the plate. This non-locking screw was placed. After this, locking screws were placed distal and proximal to this after drilling. It was determined that the non-locking screw was too long and needed to be replaced by a shorter non-locking screw. After this, final images were taken in AP and lateral and found to be in appropriate position with no intra-articular or prominent screws. At this point, an incision was made over the first dorsal compartment to inspect the first dorsal compartment tendons and to determine if there was injury to the tendon.   There was part of the tendon that appeared to be frayed; however, overall the tendon was intact. The wounds were irrigated and closed with 3-0 Monocryl in subcutaneous fashion followed by 4-0 Vicryl Rapide in interrupted fashion. The wounds were dressed with Xeroform, 4x4s, ABD, sterile cast padding, and placed into a splint. The patient was sent to recovery with a sling; and when the patient awoke, she appeared to be in pain, and she said that she could feel at the area of the plate. At this point, I have discussed the case with the anesthesiologist who said he would supplement more anesthesia for her block. We will send her home with appropriate prescriptions including Percocet for pain and Motrin for inflammation. I have instructed her and her family to ice and elevate regularly. I will see her in the outpatient setting. She will remain in the splint until I see her for followup.       DO ROOSEVELT [email or web address]  D:  09/24/2019 14:40  T:  09/25/2019 0:47  JOB #:  [id number]

## 2019-10-07 ENCOUNTER — OFFICE VISIT (OUTPATIENT)
Dept: ORTHOPEDIC SURGERY | Age: 54
End: 2019-10-07

## 2019-10-07 VITALS
TEMPERATURE: 97.8 F | DIASTOLIC BLOOD PRESSURE: 103 MMHG | BODY MASS INDEX: 41.81 KG/M2 | OXYGEN SATURATION: 95 % | SYSTOLIC BLOOD PRESSURE: 192 MMHG | WEIGHT: 266.4 LBS | RESPIRATION RATE: 10 BRPM | HEART RATE: 66 BPM | HEIGHT: 67 IN

## 2019-10-07 DIAGNOSIS — S52.532D CLOSED COLLES' FRACTURE OF LEFT RADIUS WITH ROUTINE HEALING, SUBSEQUENT ENCOUNTER: Primary | ICD-10-CM

## 2019-10-09 ENCOUNTER — HOSPITAL ENCOUNTER (OUTPATIENT)
Dept: PHYSICAL THERAPY | Age: 54
Discharge: HOME | End: 2019-10-09
Payer: COMMERCIAL

## 2019-10-09 ENCOUNTER — TELEPHONE (OUTPATIENT)
Dept: ORTHOPEDIC SURGERY | Age: 54
End: 2019-10-09

## 2019-10-09 PROCEDURE — 97535 SELF CARE MNGMENT TRAINING: CPT

## 2019-10-09 PROCEDURE — 97110 THERAPEUTIC EXERCISES: CPT

## 2019-10-09 PROCEDURE — 97165 OT EVAL LOW COMPLEX 30 MIN: CPT

## 2019-10-09 NOTE — PROGRESS NOTES
[facility]  [address]  [place], [address]  [phone] fax    Plan of Care/Statement of Necessity for Occupational Therapy Services    Patient name: Tessa Harris Start of Care: 10/9/2019   Referral source: Linda Melendez DO : 1965    Medical Diagnosis: Colles' fracture of left radius, subsequent encounter for closed fracture with routine healing [S52.532D]  Payor: BLUE CROSS / Plan: Shayy Brandon / Product Type: PPO /  Onset Date:   Treatment Diagnosis: left distal radius fracture, left wrist pain   Prior Hospitalization: see medical history Provider#: [id number]   Medications: Verified on Patient summary List    Comorbidities: depression   Prior Level of Function: Independent without limitations in ADL and IADL activities. The Plan of Care and following information is based on the information from the initial evaluation. Assessment/ key information:  Patient is a 53 y.o. female with a chief complaint of pain, limited functional use with ADL and IADL tasks and poor ROM of the left dominant UE. Patient reports that she tripped and fell over sheet that was entangled in her feet. She attempted to use the left UE to \"save\" herself from falling because she had a cup in the other hand. Her fall resulted in a comminuted distal radius fracture. She underwent surgical reconstruction with ORIF on 19. She is presenting today with moderate swelling throughout the wrist and hand and limited AROM of the digits,  Moderate amount of ecchymosis noted in dorsum of the digits, volar hand and FA. She has an incision on the volar surface of the wrist as well as radial incision with sutures intact, no signs of infection.      Patient received an initial evaluation today followed by education as to diagnosis, precautions and treatment plan.   Patient was provided with a basic home exercise program including tendon glides, AROM of the wrist and FA.        Evaluation Complexity: History LOW Complexity : Brief history review  Examination LOW Complexity : 1-3 performance deficits relating to physical, cognitive , or psychosocial skils that result in activity limitations and / or participation restrictions  Clinical Decision Making LOW Complexity : No comorbidities that affect functional and no verbal or physical assistance needed to complete eval tasks   Overall Complexity Rating: LOW      Patient would benefit from OT/Hand therapy services for the following problems:  Problem List: Pain effecting function, Decreased range of motion, Decreased strength, Edema effecting function, Decreased coordination/prehension and Decreased ADL/functional abilities                 Treatment Plan may include any combination of the following: Therapeutic exercise, Physical agent/modality, Scar management, Manual therapy, Splinting/orthoses, Patient education and ADLs/IADLs     Patient / Family readiness to learn indicated by: asking questions, trying to perform skills and interest     Persons(s) to be included in education:   patient (P) and family support person (FSP);list son     Barriers to Learning/Limitations: None     Patient Goal (s): pull up my pants without pain and having so much difficulty. [place]     Patient Self Reported Health Status: excellent     Rehabilitation Potential: good     Short Term Goals: To be accomplished in 2  weeks:  Goal:* Patient will be compliant with initial home exercise program to take an active role in their rehabilitation process. Status at [facility]:   Patient was provided with a basic home exercise program including tendon glides, AROM of the wrist and FA.        Goal:* Patient will demonstrate a good understanding of their condition and strategies for self-management.   Status at [facility]: Patient received an initial evaluation today followed by education as to diagnosis, precautions and treatment plan.       Long Term Goals: To be accomplished in 4 weeks:              Goal:* Patient will regain 240 degrees total arc of motion of the left to enable grasp of cylindrical objects such as a glass, handle or toothbrush.     Goal:* Patient will have 60 degrees of left wrist extension in order to increase ease with ADL's such as bathing, eating and dressing and to eventually bear weight thru the left upper extremity as in pushing up from a chair.      Goal:* Patient will have 60 degrees of left wrist flexion in order to perform hygiene tasks and /or work with tools such as a screwdriver.      Goal:* Patient will have 70 degrees of left forearm supination in order to perform ADL's including washing face and accepting change.      Goal:* Patient will have 70 degrees of left forearm pronation in order to type and utilize computer mouse as well as bear wt thru the hand when pushing up from a chair.     Goal:* Patient will show a 15 point improvement on FOTO functional status measure to improve overall functional performance.     Goal:*Patient will be able to pull pants up with left UE without pain/discomfort.         Frequency / Duration: Patient to be seen 2 times per week for 4 weeks:     Patient/ Caregiver education and instruction: Diagnosis, prognosis, self care, activity modification, brace/ splint application and exercises        Nesha Akins OT, CHT, CLT 10/9/2019 3:10 PM________________________________________________________________________    I certify that the above Therapy Services are being furnished while the patient is under my care. I agree with the treatment plan and certify that this therapy is necessary.     Physician's Signature:____________Date:_________TIME:________    ** Signature, Date and Time must be completed for valid certification **    Please sign and return to [facility] [address] [place] [address]  [address]  [place], [address]  [phone] fax

## 2019-10-09 NOTE — TELEPHONE ENCOUNTER
Patient left voicemail requesting pain medication. She states that since having her cast off and doing physical therapy, she has had a significant amount of pain. Please advise and contact patient.

## 2019-10-09 NOTE — PROGRESS NOTES
Hand Therapy Evaluation and Daily Note    Patient Name: Timbo Soto  Date:10/9/2019  : 1965  Age: 53 y.o.y/o  [x]  Patient  Verified  Payor: [employer] [place] / Plan: [employer] [facility] / Product Type: PPO /    Referring Provider: DO Gregory Raya MD Visit:  19  Onset Date:  19  Surgical Date: 19  Surgical Procedure: ORIF of the left wrist secondary to distal radius fracture    In time:1507  Out time:1605  Total Treatment Time (min): 58  Total Timed Codes (min): 38  1:1 Treatment Time ( only): 62  Visit #: 1 of 12    Treatment Area: Colles' fracture of left radius, subsequent encounter for closed fracture with routine healing [S52.532D]    Precautions: as per protocol    Hand Dominance: left handed   Hand Involved: left    Total Evaluation Time:  20    History of Present Condition:  Patient is a 53 y.o. female with a chief complaint of pain, limited functional use with ADL and IADL tasks and poor ROM of the left dominant UE. Patient reports that she tripped and fell over sheet that was entangled in her feet. She attempted to use the left UE to \"save\" herself from falling because she had a cup in the other hand. Pain Rating:   Current: (0-no pain 10-debilitating pain) 2/10 - 5/10 with movement   At best: (0-no pain 10-debilitating pain) 2/10  At worst: (0-no pain 10-debilitating pain) 10/10  Location: left wrist  Type:  moderate   Better with: support of splint  Worse with: movement and attempted use    Medications/Allergies/Past Medical History:  See chart; reviewed with patient. depression    Diagnostic Tests: x-ray    Prior Level of Function: Independent without limitations in ADL and IADL activities.       Current Level of Function:  Unable to use the left UE  Social History: , lives with     Occupation/Job Requirements: homemaker    Observation:  Moderate swelling throughout the wrist and hand, limited AROM of the digits   Scar/incision:   Incision on the volar surface of the wrist as well as radial incision with sutures intact, no signs of infection  Location:  Left wrist     Palpation:  Hypersensitivity to touching the incision      Range of Motion:   Elbow/Wrist   Wrist 10/9/19       Flex 15       Ext 20       UD        RD        FA         Pro 50       Sup 35       Elbow        Flex 135       Ext 0         Strength:  *NT*      Sensation:  Fingertips intermittent numbness       Edema: GIRTH CHART measured in cm  Date: NT    Side     DPC circum. Wrist Crease     FA      Elbow           Special Tests: NT    ADLs  Feeding:        []MaxA   []ModA   []Jesenai   [] CGA   [x]SBA   []Niall   []Independent  UE Dressing:       []MaxA   []ModA   []Jesenia   [] CGA   [x]SBA   []Niall   []Independent  LE Dressing:       []MaxA   []ModA   []Jesenia   [] CGA   [x]SBA   []Niall   []Independent  Grooming:       []MaxA   []ModA   []Jesenia   [] CGA   [x]SBA   []Niall   []Independent  Toileting:       []MaxA   []ModA   []Jesenia   [] CGA   []SBA   []Niall   [x]Independent  Bathing:       []MaxA   []ModA   []Jesenia   [] CGA   []SBA   []Niall   [x]Independent  Light Meal Prep:    [x]MaxA   []ModA   []Jesenia   [] CGA   []SBA   []Niall   []Independent  Household/Other: [x]MaxA   []ModA   []Jesenia   [] CGA   []SBA   []Niall   []Independent  Adaptive Equip:     []MaxA   []ModA   []Jesenia   [] CGA   []SBA   []Niall   []Independent  Driving:       [x]MaxA   []ModA   []Jesenia   [] CGA   []SBA   []Niall   []Independent      Todays Treatment:  Patient received an initial evaluation today followed by education as to diagnosis, precautions and treatment plan. Patient was provided with a basic home exercise program including tendon glides, AROM of the wrist and FA.       OBJECTIVE       15 min Therapeutic Exercise:  [x] See flow sheet :   Rationale: increase ROM and increase strength to improve the patients ability to return to functional use of the UE    23 min Self Care/Home Management: *hygiene, activity modifications, activity participation, splint wear, care and precautions. Rationale: education  to improve the patients ability to increase understanding of the     With   [] TE   [] TA   [] neuro   [] other: Patient Education: [x] Review HEP    [] Progressed/Changed HEP based on:   [] positioning   [] body mechanics   [] transfers   [] heat/ice application   [] Splint wear/care   [] Sensory re-education   [] scar management      [] other:      Pain Level (0-10 scale) post treatment: 2/10    Patient will continue to benefit from skilled OT services to address ROM deficits, address strength deficits, analyze and address soft tissue restrictions and analyze and cue movement patterns to attain goals. Assessment: Patient is a 53 y.o. female with a chief complaint of pain, limited functional use with ADL and IADL tasks and poor ROM of the left dominant UE. Patient reports that she tripped and fell over sheet that was entangled in her feet. She attempted to use the left UE to \"save\" herself from falling because she had a cup in the other hand. Her fall resulted in a comminuted distal radius fracture. She underwent surgical reconstruction with ORIF on 9/24/19. She is presenting today with moderate swelling throughout the wrist and hand and limited AROM of the digits,  Moderate amount of ecchymosis noted in dorsum of the digits, volar hand and FA. She has an incision on the volar surface of the wrist as well as radial incision with sutures intact, no signs of infection. Patient received an initial evaluation today followed by education as to diagnosis, precautions and treatment plan. Patient was provided with a basic home exercise program including tendon glides, AROM of the wrist and FA.       Evaluation Complexity: History LOW Complexity : Brief history review  Examination LOW Complexity : 1-3 performance deficits relating to physical, cognitive , or psychosocial skils that result in activity limitations and / or participation restrictions  Clinical Decision Making LOW Complexity : No comorbidities that affect functional and no verbal or physical assistance needed to complete eval tasks   Overall Complexity Rating: LOW     Patient would benefit from OT/Hand therapy services for the following problems:  Problem List: Pain effecting function, Decreased range of motion, Decreased strength, Edema effecting function, Decreased coordination/prehension and Decreased ADL/functional abilities      Treatment Plan may include any combination of the following: Therapeutic exercise, Physical agent/modality, Scar management, Manual therapy, Splinting/orthoses, Patient education and ADLs/IADLs    Patient / Family readiness to learn indicated by: asking questions, trying to perform skills and interest    Persons(s) to be included in education:   patient (P) and family support person (FSP);list son    Barriers to Learning/Limitations: None    Patient Goal (s): pull up my pants without pain and having so much difficulty.     Patient Self Reported Health Status: excellent    Rehabilitation Potential: good    Short Term Goals: To be accomplished in 2  weeks:  Goal:* Patient will be compliant with initial home exercise program to take an active role in their rehabilitation process. Status at [facility]:   Patient was provided with a basic home exercise program including tendon glides, AROM of the wrist and FA. Goal:* Patient will demonstrate a good understanding of their condition and strategies for self-management. Status at [facility]: Patient received an initial evaluation today followed by education as to diagnosis, precautions and treatment plan. Long Term Goals: To be accomplished in 4 weeks:   Goal:* Patient will regain 240 degrees total arc of motion of the left to enable grasp of cylindrical objects such as a glass, handle or toothbrush.     Goal:* Patient will have 60 degrees of left wrist extension in order to increase ease with ADL's such as bathing, eating and dressing and to eventually bear weight thru the left upper extremity as in pushing up from a chair. Goal:* Patient will have 60 degrees of left wrist flexion in order to perform hygiene tasks and /or work with tools such as a screwdriver. Goal:* Patient will have 70 degrees of left forearm supination in order to perform ADL's including washing face and accepting change. Goal:* Patient will have 70 degrees of left forearm pronation in order to type and utilize computer mouse as well as bear wt thru the hand when pushing up from a chair. Goal:* Patient will show a 15 point improvement on FOTO functional status measure to improve overall functional performance. Goal:*Patient will be able to pull pants up with left UE without pain/discomfort.        Frequency / Duration: Patient to be seen 2 times per week for 4 weeks:    Patient/ Caregiver education and instruction: Diagnosis, prognosis, self care, activity modification, brace/ splint application and exercises      Mynor Lyn OT, CHT, CLT 10/9/2019 3:10 PM

## 2019-10-11 DIAGNOSIS — S52.532D CLOSED COLLES' FRACTURE OF LEFT RADIUS WITH ROUTINE HEALING, SUBSEQUENT ENCOUNTER: Primary | ICD-10-CM

## 2019-10-11 RX ORDER — IBUPROFEN 800 MG/1
800 TABLET ORAL
Qty: 30 TAB | Refills: 0 | Status: SHIPPED | OUTPATIENT
Start: 2019-10-11 | End: 2022-07-25

## 2019-10-14 ENCOUNTER — HOSPITAL ENCOUNTER (OUTPATIENT)
Dept: PHYSICAL THERAPY | Age: 54
Discharge: HOME | End: 2019-10-14
Payer: COMMERCIAL

## 2019-10-14 PROCEDURE — 97022 WHIRLPOOL THERAPY: CPT

## 2019-10-14 PROCEDURE — 97110 THERAPEUTIC EXERCISES: CPT

## 2019-10-14 NOTE — PROGRESS NOTES
OT DAILY TREATMENT NOTE 10-18    Patient Name: Lani Simmons  Date:10/14/2019  : 1965  [x]  Patient  Verified  Payor: BLUE CROSS / Plan: [facility] [facility] / Product Type: PPO /    In time:1605  Out time:1704  Total Treatment Time (min): 62  Visit #: 2 of 8    Medicare/BCBS Only   Total Timed Codes (min):  45 1:1 Treatment Time:  35     Treatment Area: Colles' fracture of left radius, subsequent encounter for closed fracture with routine healing [S52.706D]    SUBJECTIVE  Pain Level (0-10 scale): 2/10  Any medication changes, allergies to medications, adverse drug reactions, diagnosis change, or new procedure performed?: [x] No    [] Yes (see summary sheet for update)  Subjective functional status/changes:   [] No changes reported  No new complaints offered    OBJECTIVE    Modality rationale: decrease inflammation, decrease pain and increase tissue extensibility to improve the patients ability to make a full functional fist   Min Type Additional Details    [] Estim:  []Unatt       []IFC  []Premod                        []Other:  []w/ice   []w/heat  Position:  Location:    [] Estim: []Att    []TENS instruct  []NMES                    []Other:  []w/US   []w/ice   []w/heat  Position:  Location:    []  Traction: [] Cervical       []Lumbar                       [] Prone          []Supine                       []Intermittent   []Continuous Lbs:  [] before manual  [] after manual    []  Ultrasound: []Continuous   [] Pulsed                           []1MHz   []3MHz W/cm2:  Location:    []  Iontophoresis with dexamethasone         Location: [] Take home patch   [] In clinic   20 []  Ice     [x]  Heat: Fluido  []  Ice massage  []  Laser   []  Anodyne Position:AROM  Location: left wrist/hand    []  Laser with stim  []  Other:  Position:  Location:    []  Vasopneumatic Device Pressure:       [] lo [] med [] hi   Temperature: [] lo [] med [] hi     [x] Skin assessment post-treatment:  []intact []redness- no adverse reaction    []redness - adverse reaction:     45 min Therapeutic Exercise:  [x] See flow sheet :   Rationale: increase ROM, increase strength and improve coordination to improve the patients ability to use the left UE with ADL tasks      With   [] TE   [] TA   [] neuro   [] other: Patient Education: [x] Review HEP    [] Progressed/Changed HEP based on:   [] positioning   [] body mechanics   [] transfers   [] heat/ice application   [] Splint wear/care   [] Sensory re-education   [] scar management      [] other:            Other Objective/Functional Measures: NA    Pain Level (0-10 scale) post treatment: 5-6/10    ASSESSMENT/Changes in Function: patient is slowly improving her AROM of the digits and wrist., compliance with HEP evident. Patient will continue to benefit from skilled OT services to address ROM deficits, address strength deficits and analyze and address soft tissue restrictions to attain remaining goals. [x]  See Plan of Care  []  See progress note/recertification  []  See Discharge Summary         Progress towards goals / Updated goals:  Short Term Goals: To be accomplished in 2  weeks:  Goal:* Patient will be compliant with initial home exercise program to take an active role in their rehabilitation process. Status at [facility]:   Patient was provided with a basic home exercise program including tendon glides, AROM of the wrist and FA. Current Status: Goal met 10/14/19      Goal:* Patient will demonstrate a good understanding of their condition and strategies for self-management.   Status at [facility]: Patient received an initial evaluation today followed by education as to diagnosis, precautions and treatment plan.    Current status: Goal met 10/14/19     Long Term Goals: To be accomplished in 4 weeks:              Goal:* Patient will regain 240 degrees total arc of motion of the left to enable grasp of cylindrical objects such as a glass, handle or toothbrush.     Goal:* Patient will have 60 degrees of left wrist extension in order to increase ease with ADL's such as bathing, eating and dressing and to eventually bear weight thru the left upper extremity as in pushing up from a chair.      Goal:* Patient will have 60 degrees of left wrist flexion in order to perform hygiene tasks and /or work with tools such as a screwdriver.      Goal:* Patient will have 70 degrees of left forearm supination in order to perform ADL's including washing face and accepting change.      Goal:* Patient will have 70 degrees of left forearm pronation in order to type and utilize computer mouse as well as bear wt thru the hand when pushing up from a chair.     Goal:* Patient will show a 15 point improvement on FOTO functional status measure to improve overall functional performance.     Goal:*Patient will be able to pull pants up with left UE without pain/discomfort.           PLAN  []  Upgrade activities as tolerated     [x]  Continue plan of care  []  Update interventions per flow sheet       []  Discharge due to:_  []  Other:_      Kinsey Villalta OT 10/14/2019  7:27 PM    Future Appointments   Date Time Provider Ericka Rooney   10/16/2019  4:00 PM Jessie Alvarez OT [facility]   10/21/2019  4:00 PM Shannan Ohara OT [facility]   10/23/2019  4:00 PM Shannan Ohara OT [facility]   10/28/2019  4:00 PM Shannan Ohara OT [facility]   10/30/2019  3:00 PM Shannan Ohara OT [facility]   11/4/2019  3:00 PM Shannan Ohara OT [facility]   11/6/2019  3:00 PM Shannan Ohara OT [facility]   11/7/2019 10:50 AM DO Alexia Muhammad Junior 68   11/11/2019  3:00 PM Shannan Ohara OT [facility]   11/13/2019  3:00 PM Shannan Ohara, OT [facility]

## 2019-10-16 ENCOUNTER — HOSPITAL ENCOUNTER (OUTPATIENT)
Dept: PHYSICAL THERAPY | Age: 54
Discharge: HOME | End: 2019-10-16
Payer: COMMERCIAL

## 2019-10-16 PROCEDURE — 97110 THERAPEUTIC EXERCISES: CPT

## 2019-10-16 PROCEDURE — 97022 WHIRLPOOL THERAPY: CPT

## 2019-10-16 NOTE — PROGRESS NOTES
OT DAILY TREATMENT NOTE 10-18    Patient Name: Radha Granger  Date:10/16/2019  : 1965  [x]  Patient  Verified  Payor: BLUE CROSS / Plan: [employer] [facility] / Product Type: PPO /    In time:1604 Out time:1701  Total Treatment Time (min): 62  Visit #: 3 of 8    Medicare/BCBS Only   Total Timed Codes (min):  42 1:1 Treatment Time: 42     Treatment Area: Colles' fracture of left radius, subsequent encounter for closed fracture with routine healing [S52.256D]    SUBJECTIVE  Pain Level (0-10 scale): 2/10  Any medication changes, allergies to medications, adverse drug reactions, diagnosis change, or new procedure performed?: [x] No    [] Yes (see summary sheet for update)  Subjective functional status/changes:   [] No changes reported  No new complaints offered    OBJECTIVE    Modality rationale: decrease inflammation, decrease pain and increase tissue extensibility to improve the patients ability to make a full functional fist   Min Type Additional Details    [] Estim:  []Unatt       []IFC  []Premod                        []Other:  []w/ice   []w/heat  Position:  Location:    [] Estim: []Att    []TENS instruct  []NMES                    []Other:  []w/US   []w/ice   []w/heat  Position:  Location:    []  Traction: [] Cervical       []Lumbar                       [] Prone          []Supine                       []Intermittent   []Continuous Lbs:  [] before manual  [] after manual    []  Ultrasound: []Continuous   [] Pulsed                           []1MHz   []3MHz W/cm2:  Location:    []  Iontophoresis with dexamethasone         Location: [] Take home patch   [] In clinic   15 []  Ice     [x]  Heat: Fluido  []  Ice massage  []  Laser   []  Anodyne Position:AROM  Location: left wrist/hand    []  Laser with stim  []  Other:  Position:  Location:    []  Vasopneumatic Device Pressure:       [] lo [] med [] hi   Temperature: [] lo [] med [] hi     [x] Skin assessment post-treatment:  []intact []redness- no adverse reaction    []redness - adverse reaction:     42 min Therapeutic Exercise:  [x] See flow sheet :   Rationale: increase ROM, increase strength and improve coordination to improve the patients ability to use the left UE with ADL tasks      With   [] TE   [] TA   [] neuro   [] other: Patient Education: [x] Review HEP    [] Progressed/Changed HEP based on:   [] positioning   [] body mechanics   [] transfers   [] heat/ice application   [] Splint wear/care   [] Sensory re-education   [] scar management      [] other:            Other Objective/Functional Measures: NA    Pain Level (0-10 scale) post treatment: 5-6/10    ASSESSMENT/Changes in Function: Improving ROM in digits, with reduced swelling. Patient will continue to benefit from skilled OT services to address ROM deficits, address strength deficits and analyze and address soft tissue restrictions to attain remaining goals. [x]  See Plan of Care  []  See progress note/recertification  []  See Discharge Summary         Progress towards goals / Updated goals:  Short Term Goals: To be accomplished in 2  weeks:  Goal:* Patient will be compliant with initial home exercise program to take an active role in their rehabilitation process. Status at [facility]:   Patient was provided with a basic home exercise program including tendon glides, AROM of the wrist and FA. Current Status: Goal met 10/14/19      Goal:* Patient will demonstrate a good understanding of their condition and strategies for self-management.   Status at [facility]: Patient received an initial evaluation today followed by education as to diagnosis, precautions and treatment plan.    Current status: Goal met 10/14/19     Long Term Goals: To be accomplished in 4 weeks:              Goal:* Patient will regain 240 degrees total arc of motion of the left to enable grasp of cylindrical objects such as a glass, handle or toothbrush.     Goal:* Patient will have 60 degrees of left wrist extension in order to increase ease with ADL's such as bathing, eating and dressing and to eventually bear weight thru the left upper extremity as in pushing up from a chair.      Goal:* Patient will have 60 degrees of left wrist flexion in order to perform hygiene tasks and /or work with tools such as a screwdriver.      Goal:* Patient will have 70 degrees of left forearm supination in order to perform ADL's including washing face and accepting change.      Goal:* Patient will have 70 degrees of left forearm pronation in order to type and utilize computer mouse as well as bear wt thru the hand when pushing up from a chair.     Goal:* Patient will show a 15 point improvement on FOTO functional status measure to improve overall functional performance.     Goal:*Patient will be able to pull pants up with left UE without pain/discomfort.           PLAN  []  Upgrade activities as tolerated     [x]  Continue plan of care  []  Update interventions per flow sheet       []  Discharge due to:_  []  Other:_      Linus Pond OT 10/16/2019  7:27 PM    Future Appointments   Date Time Provider Ericka Rooney   10/21/2019  4:00 PM Mendy Sanchez OT [facility]   10/23/2019  4:00 PM Carlos Ohara, OT [facility]   10/28/2019  4:00 PM Carlos Ohara OT [facility]   10/30/2019  3:00 PM Carlos Ohara, OT [facility]   11/4/2019  3:00 PM Carlos Ohara, OT [facility]   11/6/2019  3:00 PM Carlos Ohara OT [facility]   11/7/2019 10:50 AM DO Alexia Salamanca 68   11/11/2019  3:00 PM Carlos Ohara, OT [facility]   11/13/2019  3:00 PM Carlos Ohara, OT [facility]

## 2019-10-21 ENCOUNTER — HOSPITAL ENCOUNTER (OUTPATIENT)
Dept: PHYSICAL THERAPY | Age: 54
Discharge: HOME | End: 2019-10-21
Payer: COMMERCIAL

## 2019-10-21 PROCEDURE — 97022 WHIRLPOOL THERAPY: CPT

## 2019-10-21 PROCEDURE — 97110 THERAPEUTIC EXERCISES: CPT

## 2019-10-22 NOTE — PROGRESS NOTES
OT DAILY TREATMENT NOTE 10-18    Patient Name: Staci Cheatham  Date:10/21/2019  : 1965  [x]  Patient  Verified  Payor: BLUE CROSS / Plan: [facility] / Product Type: PPO /    In time:1601 Out time:1702  Total Treatment Time (min): 61  Visit #: 4 of 8    Medicare/BCBS Only   Total Timed Codes (min):  41 1:1 Treatment Time: 38     Treatment Area: Colles' fracture of left radius, subsequent encounter for closed fracture with routine healing [S52.345F]    SUBJECTIVE  Pain Level (0-10 scale): 2/10  Any medication changes, allergies to medications, adverse drug reactions, diagnosis change, or new procedure performed?: [x] No    [] Yes (see summary sheet for update)  Subjective functional status/changes:   [] No changes reported  \"Is my wrist going to get any better, I am having a hard time using the bathroom. \"    OBJECTIVE    Modality rationale: decrease inflammation, decrease pain and increase tissue extensibility to improve the patients ability to make a full functional fist   Min Type Additional Details    [] Estim:  []Unatt       []IFC  []Premod                        []Other:  []w/ice   []w/heat  Position:  Location:    [] Estim: []Att    []TENS instruct  []NMES                    []Other:  []w/US   []w/ice   []w/heat  Position:  Location:    []  Traction: [] Cervical       []Lumbar                       [] Prone          []Supine                       []Intermittent   []Continuous Lbs:  [] before manual  [] after manual    []  Ultrasound: []Continuous   [] Pulsed                           []1MHz   []3MHz W/cm2:  Location:    []  Iontophoresis with dexamethasone         Location: [] Take home patch   [] In clinic   15 []  Ice     [x]  Heat: Fluido  []  Ice massage  []  Laser   []  Anodyne Position:AROM  Location: left wrist/hand    []  Laser with stim  []  Other:  Position:  Location:    []  Vasopneumatic Device Pressure:       [] lo [] med [] hi   Temperature: [] lo [] med [] hi     [x] Skin assessment post-treatment:  []intact []redness- no adverse reaction    []redness - adverse reaction:     41 min Therapeutic Exercise:  [x] See flow sheet :   Rationale: increase ROM, increase strength and improve coordination to improve the patients ability to use the left UE with ADL tasks      With   [] TE   [] TA   [] neuro   [] other: Patient Education: [x] Review HEP    [] Progressed/Changed HEP based on:   [] positioning   [] body mechanics   [] transfers   [] heat/ice application   [] Splint wear/care   [] Sensory re-education   [] scar management      [] other:            Other Objective/Functional Measures: NA    Pain Level (0-10 scale) post treatment: 2/10    ASSESSMENT/Changes in Function: significant improvement with swelling, ROM    Patient will continue to benefit from skilled OT services to address ROM deficits, address strength deficits and analyze and address soft tissue restrictions to attain remaining goals. [x]  See Plan of Care  []  See progress note/recertification  []  See Discharge Summary         Progress towards goals / Updated goals:  Short Term Goals: To be accomplished in 2  weeks:  Goal:* Patient will be compliant with initial home exercise program to take an active role in their rehabilitation process. Status at [facility]:   Patient was provided with a basic home exercise program including tendon glides, AROM of the wrist and FA. Current Status: Goal met 10/14/19      Goal:* Patient will demonstrate a good understanding of their condition and strategies for self-management.   Status at [facility]: Patient received an initial evaluation today followed by education as to diagnosis, precautions and treatment plan.    Current status: Goal met 10/14/19     Long Term Goals: To be accomplished in 4 weeks:              Goal:* Patient will regain 240 degrees total arc of motion of the left to enable grasp of cylindrical objects such as a glass, handle or toothbrush.     Goal:* Patient will have 60 degrees of left wrist extension in order to increase ease with ADL's such as bathing, eating and dressing and to eventually bear weight thru the left upper extremity as in pushing up from a chair.      Goal:* Patient will have 60 degrees of left wrist flexion in order to perform hygiene tasks and /or work with tools such as a screwdriver.      Goal:* Patient will have 70 degrees of left forearm supination in order to perform ADL's including washing face and accepting change.      Goal:* Patient will have 70 degrees of left forearm pronation in order to type and utilize computer mouse as well as bear wt thru the hand when pushing up from a chair.     Goal:* Patient will show a 15 point improvement on FOTO functional status measure to improve overall functional performance.     Goal:*Patient will be able to pull pants up with left UE without pain/discomfort.           PLAN  []  Upgrade activities as tolerated     [x]  Continue plan of care  []  Update interventions per flow sheet       []  Discharge due to:_  []  Other:_      Nesha Akins OT 10/21/2019  7:27 PM    Future Appointments   Date Time Provider Ericka Rooney   10/23/2019  4:00 PM Aydee Vail OT [facility]   10/30/2019  3:00 PM Evelyn Ohara OT [facility]   11/4/2019  3:00 PM Evelyn Ohara OT [facility]   11/6/2019  3:00 PM Evelyn Ohara OT [facility]   11/7/2019 10:50 AM DO Alexia Encarnacion 68   11/11/2019  3:00 PM Evelyn Ohara OT [facility]   11/13/2019  3:00 PM Evelyn Ohara OT [facility]

## 2019-10-23 ENCOUNTER — APPOINTMENT (OUTPATIENT)
Dept: PHYSICAL THERAPY | Age: 54
End: 2019-10-23
Payer: COMMERCIAL

## 2019-10-28 ENCOUNTER — APPOINTMENT (OUTPATIENT)
Dept: PHYSICAL THERAPY | Age: 54
End: 2019-10-28
Payer: COMMERCIAL

## 2019-10-30 ENCOUNTER — HOSPITAL ENCOUNTER (OUTPATIENT)
Dept: PHYSICAL THERAPY | Age: 54
Discharge: HOME | End: 2019-10-30
Payer: COMMERCIAL

## 2019-10-30 PROCEDURE — 97110 THERAPEUTIC EXERCISES: CPT

## 2019-10-30 PROCEDURE — 97022 WHIRLPOOL THERAPY: CPT

## 2019-10-30 NOTE — PROGRESS NOTES
OT DAILY TREATMENT NOTE 10-18    Patient Name: April Romero  Date:10/30/2019  : 1965  [x]  Patient  Verified  Payor: Tomasz Segundo / [facility] / Product Type: PPO /    In time:1505  Out time:1546  Total Treatment Time (min):41  Visit #: 5 of 8    Medicare/BCBS Only   Total Timed Codes (min):  26 1:1 Treatment Time:  26     Treatment Area: Colles' fracture of left radius, subsequent encounter for closed fracture with routine healing [S52.532D]    SUBJECTIVE  Pain Level (0-10 scale):0*  Any medication changes, allergies to medications, adverse drug reactions, diagnosis change, or new procedure performed?: [x] No    [] Yes (see summary sheet for update)  Subjective functional status/changes:   [] No changes reported  Increased pain with functional strengthening tasks    OBJECTIVE    Modality rationale: decrease inflammation, decrease pain and increase tissue extensibility to improve the patients ability to make a full functional fist   Min Type Additional Details      [] Estim:  []Unatt       []IFC  []Premod                        []Other:  []w/ice   []w/heat  Position:  Location:      [] Estim: []Att    []TENS instruct  []NMES                    []Other:  []w/US   []w/ice   []w/heat  Position:  Location:      []  Traction: [] Cervical       []Lumbar                       [] Prone          []Supine                       []Intermittent   []Continuous Lbs:  [] before manual  [] after manual      []  Ultrasound: []Continuous   [] Pulsed                           []1MHz   []3MHz W/cm2:  Location:      []  Iontophoresis with dexamethasone         Location: [] Take home patch   [] In clinic    15 []  Ice     [x]  Heat: Fluido  []  Ice massage  []  Laser   []  Anodyne Position:AROM  Location: left wrist/hand      []  Laser with stim  []  Other:  Position:  Location:      []  Vasopneumatic Device Pressure:       [] lo [] med [] hi   Temperature: [] lo [] med [] hi       [x] Skin assessment post-treatment:  []intact []redness- no adverse reaction    []redness - adverse reaction:      26 min Therapeutic Exercise:  [x] See flow sheet :   Rationale: increase ROM, increase strength and improve coordination to improve the patients ability to use the left UE with ADL tasks        With   [] TE   [] TA   [] neuro   [] other: Patient Education: [x] Review HEP    [] Progressed/Changed HEP based on:   [] positioning   [] body mechanics   [] transfers   [] heat/ice application   [] Splint wear/care   [] Sensory re-education   [] scar management      [] other:                                                         Other Objective/Functional Measures: NA     Pain Level (0-10 scale) post treatment: 2/10     ASSESSMENT/Changes in Function: initiated PROM and gentle wrist strenghtening with minimal increase in pain.     Patient will continue to benefit from skilled OT services to address ROM deficits, address strength deficits and analyze and address soft tissue restrictions to attain remaining goals. [x]  See Plan of Care  []  See progress note/recertification  []  See Discharge Summary         Progress towards goals / Updated goals:  Short Term Goals: To be accomplished in 2  weeks:  Goal:* Patient will be compliant with initial home exercise program to take an active role in their rehabilitation process. Status at [facility]:   Patient was provided with a basic home exercise program including tendon glides, AROM of the wrist and FA. Current Status: Goal met 10/14/19      Goal:* Patient will demonstrate a good understanding of their condition and strategies for self-management.   Status at [facility]: Patient received an initial evaluation today followed by education as to diagnosis, precautions and treatment plan.    Current status: Goal met 10/14/19     Long Term Goals: To be accomplished in 4 weeks:              Goal:* Patient will regain 240 degrees total arc of motion of the left to enable grasp of cylindrical objects such as a glass, handle or toothbrush. Status as last note/recert: Goal met 37/63/60- full composite fist.     Goal:* Patient will have 60 degrees of left wrist extension in order to increase ease with ADL's such as bathing, eating and dressing and to eventually bear weight thru the left upper extremity as in pushing up from a chair.      Goal:* Patient will have 60 degrees of left wrist flexion in order to perform hygiene tasks and /or work with tools such as a screwdriver.      Goal:* Patient will have 70 degrees of left forearm supination in order to perform ADL's including washing face and accepting change.      Goal:* Patient will have 70 degrees of left forearm pronation in order to type and utilize computer mouse as well as bear wt thru the hand when pushing up from a chair.     Goal:* Patient will show a 15 point improvement on FOTO functional status measure to improve overall functional performance.     Goal:*Patient will be able to pull pants up with left UE without pain/discomfort.     Status as last note/recert:  Goal met per report 10/30/19        PLAN  []  Upgrade activities as tolerated     []  Continue plan of care  []  Update interventions per flow sheet       []  Discharge due to:_  []  Other:_      Brandi Jenkins OT 10/30/2019  4:38 PM    Future Appointments   Date Time Provider Ericka Rooney   11/4/2019  2:30 [facility]   11/6/2019  2:30 PM [facility]2019 10[facility]   11/11/2019  3:00 PM [facility]   11/13/2019  3:00 PM [facility]   11/18/2019  1:00 PM [facility]

## 2019-11-04 ENCOUNTER — APPOINTMENT (OUTPATIENT)
Dept: PHYSICAL THERAPY | Age: 54
End: 2019-11-04
Payer: COMMERCIAL

## 2019-11-06 ENCOUNTER — HOSPITAL ENCOUNTER (OUTPATIENT)
Dept: PHYSICAL THERAPY | Age: 54
Discharge: HOME | End: 2019-11-06
Payer: COMMERCIAL

## 2019-11-06 PROCEDURE — 97110 THERAPEUTIC EXERCISES: CPT

## 2019-11-06 PROCEDURE — 97022 WHIRLPOOL THERAPY: CPT

## 2019-11-06 NOTE — PROGRESS NOTES
OT DAILY TREATMENT NOTE 10-18    Patient Name: Aniyah Morales  Date:2019  : 1965  [x]  Patient  Verified  Payor: BLUE CROSS / [facility] / Product Type: PPO /    In time:1505  Out time:1546  Total Treatment Time (min):41  Visit #: 5 of 8    Medicare/BCBS Only   Total Timed Codes (min):  26 1:1 Treatment Time:  26     Treatment Area: Colles' fracture of left radius, subsequent encounter for closed fracture with routine healing [S52.532D]    SUBJECTIVE  Pain Level (0-10 scale):0*  Any medication changes, allergies to medications, adverse drug reactions, diagnosis change, or new procedure performed?: [x] No    [] Yes (see summary sheet for update)  Subjective functional status/changes:   [] No changes reported  Increased pain with functional strengthening tasks    OBJECTIVE    Modality rationale: decrease inflammation, decrease pain and increase tissue extensibility to improve the patients ability to make a full functional fist   Min Type Additional Details      [] Estim:  []Unatt       []IFC  []Premod                        []Other:  []w/ice   []w/heat  Position:  Location:      [] Estim: []Att    []TENS instruct  []NMES                    []Other:  []w/US   []w/ice   []w/heat  Position:  Location:      []  Traction: [] Cervical       []Lumbar                       [] Prone          []Supine                       []Intermittent   []Continuous Lbs:  [] before manual  [] after manual      []  Ultrasound: []Continuous   [] Pulsed                           []1MHz   []3MHz W/cm2:  Location:      []  Iontophoresis with dexamethasone         Location: [] Take home patch   [] In clinic    15 []  Ice     [x]  Heat: Fluido  []  Ice massage  []  Laser   []  Anodyne Position:AROM  Location: left wrist/hand      []  Laser with stim  []  Other:  Position:  Location:      []  Vasopneumatic Device Pressure:       [] lo [] med [] hi   Temperature: [] lo [] med [] hi       [x] Skin assessment post-treatment:  []intact []redness- no adverse reaction    []redness - adverse reaction:      40 min Therapeutic Exercise:  [x] See flow sheet :   Rationale: increase ROM, increase strength and improve coordination to improve the patients ability to use the left UE with ADL tasks        With   [] TE   [] TA   [] neuro   [] other: Patient Education: [x] Review HEP    [] Progressed/Changed HEP based on:   [] positioning   [] body mechanics   [] transfers   [] heat/ice application   [] Splint wear/care   [] Sensory re-education   [] scar management      [] other:                                                         Other Objective/Functional Measures: Range of Motion:   Elbow/Wrist   Wrist 10/9/19 11/6/19          Flex 15 50          Ext 20 60          UD             RD             FA              Pro 50 65          Sup 35  65         Elbow             Flex 135           Ext 0              Strength:   Measurements: Taken with Eduard Dynamometer, in Lbs   Level 2 11/6/19 Date Date Date Date Date Date   Right 45         Left 19         Deficit          Change                Pinch Measurements: Taken with Pinch Gauge, in Lbs   (hand) 11/6/19 Date Date Date Date Date   Lateral          Right 16        Left  8        Deficit         Change         Pad         Right 9        Left 3        Deficit         Change         Onur         Right 11        Left 8        Deficit         Change              Pain Level (0-10 scale) post treatment: 2/10     ASSESSMENT/Changes in Function: limited with strength, improving AROM of the wrist. Progressing toward goals.     Patient will continue to benefit from skilled OT services to address ROM deficits, address strength deficits and analyze and address soft tissue restrictions to attain remaining goals. [x]  See Plan of Care  []  See progress note/recertification  []  See Discharge Summary         Progress towards goals / Updated goals:  Short Term Goals:  To be accomplished in 2  weeks:  Goal:* Patient will be compliant with initial home exercise program to take an active role in their rehabilitation process. Status at [facility]:   Patient was provided with a basic home exercise program including tendon glides, AROM of the wrist and FA. Current Status: Goal met 10/14/19      Goal:* Patient will demonstrate a good understanding of their condition and strategies for self-management. Status at [facility]: Patient received an initial evaluation today followed by education as to diagnosis, precautions and treatment plan.    Current status: Goal met 10/14/19     Long Term Goals: To be accomplished in 4 weeks:              Goal:* Patient will regain 240 degrees total arc of motion of the left to enable grasp of cylindrical objects such as a glass, handle or toothbrush. Status as last note/recert: Goal met 30/83/09- full composite fist.     Goal:* Patient will have 60 degrees of left wrist extension in order to increase ease with ADL's such as bathing, eating and dressing and to eventually bear weight thru the left upper extremity as in pushing up from a chair. Status as last note/recert: 60 degrees- Goal met        Goal:* Patient will have 60 degrees of left wrist flexion in order to perform hygiene tasks and /or work with tools such as a screwdriver. Status as last note/recert: 50 degrees        Goal:* Patient will have 70 degrees of left forearm supination in order to perform ADL's including washing face and accepting change. Status as last note/recert: 65 degrees     Goal:* Patient will have 70 degrees of left forearm pronation in order to type and utilize computer mouse as well as bear wt thru the hand when pushing up from a chair. Status as last note/recert: 65 degrees       Goal:* Patient will show a 15 point improvement on FOTO functional status measure to improve overall functional performance.   Status as last note/recert:  Goal met 44/1/50       Goal:*Patient will be able to pull pants up with left UE without pain/discomfort.     Status as last note/recert:  Goal met per report 10/30/19        PLAN  []  Upgrade activities as tolerated     []  Continue plan of care  []  Update interventions per flow sheet       []  Discharge due to:_  []  Other:_      Anupama Jernigan OT 11/6/2019  4:38 PM    Future Appointments   Date Time Provider Ericka Rooney   11/7/2019 10:50 AM DO Alexia Savage Ayo 68   11/11/2019  3:00 PM Boo Lucas OT [facility]   11/13/2019  3:00 PM Cattenhead, Carney Aschoff, [facility]   11/18/2019  1:00 PM Cattenhead, Carney Aschoff, JB [facility]

## 2019-11-06 NOTE — PROGRESS NOTES
[facility]   Rebekah Jammie [address]rier [address]  [place], [address]  [phone] fax    Occupational Therapy Progress Note  Patient name: Anna De Jesus Start of Care: 10/9/19   Referral source: Jon Peters DO : 1965   Medical/Treatment Diagnosis: Colles' fracture of left radius, subsequent encounter for closed fracture with routine healing [S52.532D]  Payor: BLUE CROSS / Plan: [employer] [facility] / Product Type: PPO /  Onset Date:19     Prior Hospitalization: see medical history Provider#: [id number]   Medications: Verified on Patient Summary List    Comorbidities: depression   Prior Level of Function: Independent without limitations in ADL and IADL activities.   Visits from Start of Care: 6    Missed Visits: 1    Established Goals:         Excellent           Good         Limited           None  [x] Increased ROM   []  [x]  []  []  [x] Increased Strength  []  [x]  []  []  [] Increased Mobility  []  []  []  []   [x] Decreased Pain   []  [x]  []  []  [] Decreased Swelling  []  []  []  []  [] Increased Fine Motor Skills []  []  []  []  [] Increased ADL [place] []  []  []  []    Key Functional Changes: Range of Motion:   Elbow/Wrist   Wrist 10/9/19 11/6/19          Flex 15 50          Ext 20 60          UD             RD             FA              Pro 50 65          Sup 35  65         Elbow             Flex 135           Ext 0              Strength:   Measurements: Taken with Eduard Dynamometer, in Lbs   Level 2 19 Date Date Date Date Date Date   Right 39               Left 19               Deficit                 Change                       Pinch Measurements: Taken with Pinch Gauge, in Lbs   (hand) 19 Date Date Date Date Date   Lateral                Right 16             Left  8             Deficit               Change               Pad               Right 9             Left 3             Deficit               Change             Onur               Right 11             Left 8             Deficit               Change                       Updated Goals: to be achieved in 4 weeks:  Short Term Goals: To be accomplished in 2  weeks:      Goal:* Patient will have 60 degrees of left wrist flexion in order to perform hygiene tasks and /or work with tools such as a screwdriver. Status as last note/recert: 50 degrees         Goal:* Patient will have 70 degrees of left forearm supination in order to perform ADL's including washing face and accepting change. Status as last note/recert: 65 degrees     Goal:* Patient will have 70 degrees of left forearm pronation in order to type and utilize computer mouse as well as bear wt thru the hand when pushing up from a chair. Status as last note/recert: 65 degrees    Goal:* Pt will have 40 pounds of  in the left hand to allow for functional grasp for all ADL activities including dressing, bathing and self care. Goal:* Patient will have improved left lateral pinch strength of  13 pounds in order to perform fine motor tasks such as turning pages, turning ignition and open containers. .    Goal:* Patient will have improved left tip pinch strength of 7 pounds in order to perform fine motor tasks such as zippiing up zippers or opening containers.     Goal:* Patient will have improved left onur pinch strength of 10 pounds in order to perform fine motor tasks such as writing.         ASSESSMENT/RECOMMENDATIONS:  [x]Continue therapy per initial plan/protocol at a frequency of  1-2 x per week for 4 weeks  []Continue therapy with the following recommended changes:_____________________      _____________________________________________________________________  []Discontinue therapy progressing towards or have reached established goals  []Discontinue therapy due to lack of appreciable progress towards goals  []Discontinue therapy due to lack of attendance or compliance  []Await Physician's recommendations/decisions regarding therapy  []Other:________________________________________________________________    Thank you for this referral.   Krista Vela OT 11/6/2019 3:09 PM  NOTE TO PHYSICIAN:  PLEASE COMPLETE THE ORDERS BELOW AND   FAX TO [facility]: [phone]  If you are unable to process this request in 24 hours please contact our office: [phone]    ? I have read the above report and request that my patient continue as recommended. ? I have read the above report and request that my patient continue therapy with the following changes/special instructions:__________________________________________________________  ? I have read the above report and request that my patient be discharged from therapy.     [de-identified] Signature:____________Date:_________TIME:________    [facility], Date and Time must be completed for valid certification **

## 2019-11-07 ENCOUNTER — OFFICE VISIT (OUTPATIENT)
Dept: ORTHOPEDIC SURGERY | Age: 54
End: 2019-11-07

## 2019-11-07 VITALS
HEIGHT: 67 IN | TEMPERATURE: 98.7 F | OXYGEN SATURATION: 91 % | HEART RATE: 66 BPM | SYSTOLIC BLOOD PRESSURE: 152 MMHG | DIASTOLIC BLOOD PRESSURE: 83 MMHG | BODY MASS INDEX: 41.75 KG/M2 | RESPIRATION RATE: 18 BRPM | WEIGHT: 266 LBS

## 2019-11-07 DIAGNOSIS — S52.532D CLOSED COLLES' FRACTURE OF LEFT RADIUS WITH ROUTINE HEALING, SUBSEQUENT ENCOUNTER: Primary | ICD-10-CM

## 2019-11-07 NOTE — PATIENT INSTRUCTIONS
Wrist Fracture: Rehab Exercises  Introduction  Here are some examples of exercises for you to try. The exercises may be suggested for a condition or for rehabilitation. Start each exercise slowly. Ease off the exercises if you start to have pain. You will be told when to start these exercises and which ones will work best for you. How to do the exercises  Wrist flexion and extension    1. Place your forearm on a table, with your hand and affected wrist extended beyond the table, palm down. 2. Bend your wrist to move your hand upward and allow your hand to close into a fist, then lower your hand and allow your fingers to relax. Hold each position for about 6 seconds. 3. Repeat 8 to 12 times. Hand flips    1. While seated, place your forearm and affected wrist on your thigh, palm down. 2. Flip your hand over so the back of your hand rests on your thigh and your palm is up. Alternate between palm up and palm down while keeping your forearm on your thigh. 3. Repeat 8 to 12 times. Wrist radial and ulnar deviation    1. Hold your affected hand out in front of you, palm down. 2. Slowly bend your wrist as far as you can from side to side. Hold each position for about 6 seconds. 3. Repeat 8 to 12 times. Wrist extensor stretch    1. Extend the arm with the affected wrist in front of you and point your fingers toward the floor. 2. With your other hand, gently bend your wrist farther until you feel a mild to moderate stretch in your forearm. 3. Hold the stretch for at least 15 to 30 seconds. 4. Repeat 2 to 4 times. 5. When you can do this stretch with ease and no pain, repeat steps 1 through 4. But this time extend your affected arm in front of you and make a fist with your palm facing down. Then bend your wrist, pointing your fist toward the floor. Wrist flexor stretch    1. Extend the arm with the affected wrist in front of you with your palm facing away from your body.   2. Bend back your wrist, pointing your hand up toward the ceiling. 3. With your other hand, gently bend your wrist farther until you feel a mild to moderate stretch in your forearm. 4. Hold the stretch for at least 15 to 30 seconds. 5. Repeat 2 to 4 times. 6. Repeat steps 1 through 5, but this time extend your affected arm in front of you with your palm facing up. Then bend back your wrist, pointing your hand toward the floor. Intrinsic flexion    1. Rest the hand with the affected wrist on a table and bend the large joints where your fingers connect to your hand. Keep your thumb and the other joints in your fingers straight. 2. Slowly straighten your fingers. Your wrist should be relaxed, following the line of your fingers and thumb. 3. Move back to your starting position, with your hand bent. 4. Repeat 8 to 12 times. MP extension    1. Place your good hand on a table, palm up. Put the hand with the affected wrist on top of your good hand with your fingers wrapped around the thumb of your good hand like you are making a fist.  2. Slowly uncurl the joints of the hand with the affected wrist where your fingers connect to your hand so that only the top two joints of your fingers are bent. Your fingers will look like a hook. Hold the position for about 6 seconds. 3. Move back to your starting position, with your fingers wrapped around your good thumb. 4. Repeat 8 to 12 times. Follow-up care is a key part of your treatment and safety. Be sure to make and go to all appointments, and call your doctor if you are having problems. It's also a good idea to know your test results and keep a list of the medicines you take. Where can you learn more? Go to http://tiffanie-audra.info/. Enter [id number] in the search box to learn more about \"Wrist Fracture: Rehab Exercises. \"  Current as of: June 26, 2019  Content Version: 12.2  © 5220-0720 [employer].  Care instructions adapted under license by [employer] (which disclaims liability or warranty for this information). If you have questions about a medical condition or this instruction, always ask your healthcare professional. [address] any warranty or liability for your use of this information.

## 2019-11-07 NOTE — PROGRESS NOTES
Tessa Harris is a 53 y.o. female right handed individual, not currently working. Worker's Compensation and legal considerations: not known. Vitals:    11/07/19 1048   BP: 152/83   Pulse: 66   Resp: 18   Temp: 98.7 °F (37.1 °C)   TempSrc: Oral   SpO2: 91%   Weight: 266 lb (120.7 kg)   Height: 5' 7\" (1.702 m)   PainSc:   0 - No pain   PainLoc: Wrist           Chief Complaint   Patient presents with    Wrist Pain     LEFT WRIST PAIN       HPI: Patient comes in today for follow-up of her left distal radius open reduction internal fixation. She has been doing well and in therapy since her last visit. She is approximately 6 weeks postop. She reports some stiffness with flexion extension however improved from the last visit. She also reports some numbness over the dorsal first webspace. 1st PO HPI: Patient comes in today for her first postoperative visit approximately 2 weeks status post left distal radius open reduction internal fixation. She has been in a splint. She reports the pain has been bad except it was hurting the past couple nights. She denies any numbness or tingling. She has some decreased range of motion due to pain and stiffness. Initial HPI: Pt comes in today after falling this afternoon and injuring her left wrist.  She was splinted in ED and referred here    Date of onset:  9/19/2019    Injury: Yes: Comment: fall    Prior Treatment:  Yes: Comment: ED Splint    Numbness/ Tingling: No    ROS: Review of Systems - General ROS: negative  Respiratory ROS: no cough, shortness of breath, or wheezing  Cardiovascular ROS: no chest pain or dyspnea on exertion  Musculoskeletal ROS: positive for - pain in wrist - left  Neurological ROS: negative  Dermatological ROS: negative    History reviewed. No pertinent past medical history.     Past Surgical History:   Procedure Laterality Date    HX HYSTERECTOMY      HX PELVIC LAPAROSCOPY         Current Outpatient Medications   Medication Sig Dispense Refill    ibuprofen (MOTRIN) 800 mg tablet Take 1 Tab by mouth every eight (8) hours as needed for Pain. 30 Tab 0    ALPRAZolam (XANAX) 0.5 mg tablet Take  by mouth two (2) times a day. Indications: anxious      ondansetron (ZOFRAN ODT) 4 mg disintegrating tablet Take 1 Tab by mouth every eight (8) hours as needed for Nausea. 12 Tab 0       No Known Allergies      PE:     Left Wrist: Incision is healed with no evidence of breakdown. Sensation is grossly intact distally with the exception of some decreased sensation over the dorsal first webspace. Range of motion in flexion is approximately 30 degrees and 20 degrees and extension of the wrist.  Range of motion of the digits is full with a 0 cm palm to pulp distance. Cap refill is brisk. Imaging:     Plain films of the left wrist show a mostly healed distal radius fracture with plate and screws in appropriate position. There is also an ulnar styloid fracture noted with no displacement. There is no hardware loosening. The second most ulnar screw distally on the AP appears to go distal into the joint however on the lateral it does not go into the joint and is dorsal to the joint. ICD-10-CM ICD-9-CM    1. Closed Colles' fracture of left radius with routine healing, subsequent encounter S52.532D V54.12 AMB POC XRAY, WRIST; COMPLETE, 3+ VIE       Plan:     Continue OT as directed by therapist.    Continue working on range of motion exercises.     Follow-up in 6 weeks for reevaluation and x-rays    Plan was reviewed with patient, who verbalized agreement and understanding of the plan

## 2019-11-07 NOTE — PROGRESS NOTES
1. Have you been to the ER, urgent care clinic since your last visit? Hospitalized since your last visit? No    2. Have you seen or consulted any other health care providers outside of the [address] [place] [address] since your last visit? Include any pap smears or colon screening.  No

## 2019-11-11 ENCOUNTER — HOSPITAL ENCOUNTER (OUTPATIENT)
Dept: PHYSICAL THERAPY | Age: 54
Discharge: HOME | End: 2019-11-11
Payer: COMMERCIAL

## 2019-11-11 PROCEDURE — 97110 THERAPEUTIC EXERCISES: CPT

## 2019-11-11 PROCEDURE — 97022 WHIRLPOOL THERAPY: CPT

## 2019-11-11 NOTE — PROGRESS NOTES
OT DAILY TREATMENT NOTE 10-18    Patient Name: Zaida Marti  Date:2019  : 1965  [x]  Patient  Verified  Payor: BLUE CROSS / Plan: [facility] / Product Type: PPO /    In time:1500  Out time:1600  Total Treatment Time (min): 60  Visit #: 1 of 8    Medicare/BCBS Only   Total Timed Codes (min):  40 1:1 Treatment Time:  40     Treatment Area: Colles' fracture of left radius, subsequent encounter for closed fracture with routine healing [S50.104W]    SUBJECTIVE  Pain Level (0-10 scale): 0/10  Any medication changes, allergies to medications, adverse drug reactions, diagnosis change, or new procedure performed?: [x] No    [] Yes (see summary sheet for update)  Subjective functional status/changes:   [] No changes reported  \"I just have such a hard time when I am going to the bathroom. \"    OBJECTIVE    Modality rationale: decrease pain and increase tissue extensibility to improve the patients ability to perform functional ROM with the left UE   Min Type Additional Details    [] Estim:  []Unatt       []IFC  []Premod                        []Other:  []w/ice   []w/heat  Position:  Location:    [] Estim: []Att    []TENS instruct  []NMES                    []Other:  []w/US   []w/ice   []w/heat  Position:  Location:    []  Traction: [] Cervical       []Lumbar                       [] Prone          []Supine                       []Intermittent   []Continuous Lbs:  [] before manual  [] after manual    []  Ultrasound: []Continuous   [] Pulsed                           []1MHz   []3MHz W/cm2:  Location:    []  Iontophoresis with dexamethasone         Location: [] Take home patch   [] In clinic   15 []  Ice     [x]  heat  []  Ice massage  []  Laser   []  Anodyne Position: AROM  Location: left hand    []  Laser with stim  []  Other:  Position:  Location:    []  Vasopneumatic Device Pressure:       [] lo [] med [] hi   Temperature: [] lo [] med [] hi       [x] Skin assessment post-treatment:  [x]intact [x]redness- no adverse reaction    []redness - adverse reaction:     40 min Therapeutic Exercise:  [x] See flow sheet :   Rationale: increase ROM, increase strength and improve coordination to improve the patients ability to improve functional ROM/use of the left dominant UE. With   [] TE   [] TA   [] neuro   [] other: Patient Education: [x] Review HEP    [] Progressed/Changed HEP based on:   [] positioning   [] body mechanics   [] transfers   [] heat/ice application   [] Splint wear/care   [] Sensory re-education   [] scar management      [] other:            Other Objective/Functional Measures: NA     Pain Level (0-10 scale) post treatment: 0    ASSESSMENT/Changes in Function: continues to require review for understanding with progression of recovery as she is expecting to much at once. Patient will continue to benefit from skilled OT services to address ROM deficits, address strength deficits and analyze and address soft tissue restrictions to attain remaining goals. [x]  See Plan of Care  []  See progress note/recertification  []  See Discharge Summary         Progress towards goals / Updated goals:  Goal:* Patient will have 60 degrees of left wrist flexion in order to perform hygiene tasks and /or work with tools such as a screwdriver. Status as last note/recert: 50 degrees       Goal:* Patient will have 70 degrees of left forearm supination in order to perform ADL's including washing face and accepting change. Status as last note/recert: 65 degrees     Goal:* Patient will have 70 degrees of left forearm pronation in order to type and utilize computer mouse as well as bear wt thru the hand when pushing up from a chair.   Status as last note/recert: 65 degrees     Goal:* Pt will have 40 pounds of  in the left hand to allow for functional grasp for all ADL activities including dressing, bathing and self care.     Goal:* Patient will have improved left lateral pinch strength of  13 pounds in order to perform fine motor tasks such as turning pages, turning ignition and open containers. .     Goal:* Patient will have improved left tip pinch strength of 7 pounds in order to perform fine motor tasks such as zippiing up zippers or opening containers.     Goal:* Patient will have improved left bautista pinch strength of 10 pounds in order to perform fine motor tasks such as writing.     PLAN  []  Upgrade activities as tolerated     [x]  Continue plan of care  []  Update interventions per flow sheet       []  Discharge due to:_  []  Other:_      Heron Mccracken OT 11/11/2019  3:24 PM    Future Appointments   Date Time Provider Ericka Rooney   11/18/2019  1:00 PM Josefa Silver OT [facility]   12/23/2019 10:40 AM DO Alexia Glynn 68

## 2019-11-13 ENCOUNTER — APPOINTMENT (OUTPATIENT)
Dept: PHYSICAL THERAPY | Age: 54
End: 2019-11-13
Payer: COMMERCIAL

## 2019-11-19 ENCOUNTER — APPOINTMENT (OUTPATIENT)
Dept: PHYSICAL THERAPY | Age: 54
End: 2019-11-19
Payer: COMMERCIAL

## 2019-11-21 ENCOUNTER — HOSPITAL ENCOUNTER (OUTPATIENT)
Dept: PHYSICAL THERAPY | Age: 54
Discharge: HOME | End: 2019-11-21
Payer: COMMERCIAL

## 2019-11-21 PROCEDURE — 97110 THERAPEUTIC EXERCISES: CPT

## 2019-11-21 NOTE — PROGRESS NOTES
OT DAILY TREATMENT NOTE 10-18    Patient Name: Rajat Melendez  Date:2019  : 1965  [x]  Patient  Verified  Payor: BLUE CROSS / Plan: [address] [place] [address] [place] / Product Type: PPO /    In time:1300  Out time:1400  Total Treatment Time (min): 45  Visit #: 2 of 8    Medicare/BCBS Only   Total Timed Codes (min): 45 1:1 Treatment Time:  39     Treatment Area: Colles' fracture of left radius, subsequent encounter for closed fracture with routine healing [S52.532D]    SUBJECTIVE  Pain Level (0-10 scale): 0/10  Any medication changes, allergies to medications, adverse drug reactions, diagnosis change, or new procedure performed?: [x] No    [] Yes (see summary sheet for update)  Subjective functional status/changes:   [] No changes reported  \"I only have pain when I really work it out    OBJECTIVE      60 min Therapeutic Exercise:  [x] See flow sheet :   Rationale: increase ROM, increase strength and improve coordination to improve the patients ability to move/use the left UE /wrist with functional activities. With   [] TE   [] TA   [] neuro   [] other: Patient Education: [x] Review HEP    [] Progressed/Changed HEP based on:   [] positioning   [] body mechanics   [] transfers   [] heat/ice application   [] Splint wear/care   [] Sensory re-education   [] scar management      [] other:            Other Objective/Functional Measures: NA     Pain Level (0-10 scale) post treatment: 0    ASSESSMENT/Changes in Function: patient continues to push herself even though she is complaining of discomfort with activities. Patient will continue to benefit from skilled OT services to modify and progress therapeutic interventions, address ROM deficits, address strength deficits and analyze and address soft tissue restrictions to attain remaining goals.      [x]  See Plan of Care  []  See progress note/recertification  []  See Discharge Summary         Progress towards goals / Updated goals:  Goal:* Patient will have 60 degrees of left wrist flexion in order to perform hygiene tasks and /or work with tools such as a screwdriver. Status as last note/recert: 50 degrees     Goal:* Patient will have 70 degrees of left forearm supination in order to perform ADL's including washing face and accepting change. Status as last note/recert: 65 degrees     Goal:* Patient will have 70 degrees of left forearm pronation in order to type and utilize computer mouse as well as bear wt thru the hand when pushing up from a chair. Status as last note/recert: 65 degrees     Goal:* Pt will have 40 pounds of  in the left hand to allow for functional grasp for all ADL activities including dressing, bathing and self care. Status as last note/recert: progressing with functional strengthening tasks.     Goal:* Patient will have improved left lateral pinch strength of  13 pounds in order to perform fine motor tasks such as turning pages, turning ignition and open containers. .     Goal:* Patient will have improved left tip pinch strength of 7 pounds in order to perform fine motor tasks such as zippiing up zippers or opening containers.     Goal:* Patient will have improved left bautista pinch strength of 10 pounds in order to perform fine motor tasks such as writing.       PLAN  []  Upgrade activities as tolerated     [x]  Continue plan of care  []  Update interventions per flow sheet       []  Discharge due to:_  []  Other:_      Tino Littlejohn OT 11/21/2019  1:18 PM    Future Appointments   Date Time Provider Ericka Rooney   11/26/2019  1:00 PM [facility]   12/3/2019  1:00 PM Tray Ohara OT [facility]   12/5/2019  1:30 PM [facility]   12/23/2019 10:40 AM DO Alexia Bustillos 68

## 2019-11-26 ENCOUNTER — HOSPITAL ENCOUNTER (OUTPATIENT)
Dept: PHYSICAL THERAPY | Age: 54
Discharge: HOME | End: 2019-11-26
Payer: COMMERCIAL

## 2019-11-26 PROCEDURE — 97022 WHIRLPOOL THERAPY: CPT

## 2019-11-26 PROCEDURE — 97110 THERAPEUTIC EXERCISES: CPT

## 2019-11-26 NOTE — PROGRESS NOTES
OT DAILY TREATMENT NOTE 10-18    Patient Name: Maria Eugenia Atwood  Date:2019  : 1965  [x]  Patient  Verified  Payor: BLUE CROSS / Plan: [facility] / Product Type: PPO /    In time:1305  Out time:1400  Total Treatment Time (min): 54  Visit #: 3 of 8    Medicare/BCBS Only   Total Timed Codes (min):  40 1:1 Treatment Time:  40     Treatment Area: Colles' fracture of left radius, subsequent encounter for closed fracture with routine healing [S52.431J]    SUBJECTIVE  Pain Level (0-10 scale): 3-4/10  Any medication changes, allergies to medications, adverse drug reactions, diagnosis change, or new procedure performed?: [x] No    [] Yes (see summary sheet for update)  Subjective functional status/changes:   [] No changes reported  \"I think I overdid it, I was using a bag of apples to hold, to stretch my wrist so that I can bend it down. \"    OBJECTIVE    Modality rationale: decrease pain and increase tissue extensibility to improve the patients ability to left wrist   Min Type Additional Details    [] Estim:  []Unatt       []IFC  []Premod                        []Other:  []w/ice   []w/heat  Position:  Location:    [] Estim: []Att    []TENS instruct  []NMES                    []Other:  []w/US   []w/ice   []w/heat  Position:  Location:    []  Traction: [] Cervical       []Lumbar                       [] Prone          []Supine                       []Intermittent   []Continuous Lbs:  [] before manual  [] after manual    []  Ultrasound: []Continuous   [] Pulsed                           []1MHz   []3MHz W/cm2:  Location:    []  Iontophoresis with dexamethasone         Location: [] Take home patch   [] In clinic   15 []  Ice     [x]  heat  []  Ice massage  []  Laser   []  Anodyne Position: AROM  Location: left wrist    []  Laser with stim  []  Other:  Position:  Location:    []  Vasopneumatic Device Pressure:       [] lo [] med [] hi   Temperature: [] lo [] med [] hi     [x] Skin assessment post-treatment:  []intact []redness- no adverse reaction    []redness - adverse reaction:       40 min Therapeutic Exercise:  [x] See flow sheet :   Rationale: increase ROM and increase strength to improve the patients ability to return to full functional use of the left UE    With   [] TE   [] TA   [] neuro   [] other: Patient Education: [x] Review HEP    [] Progressed/Changed HEP based on:   [] positioning   [] body mechanics   [] transfers   [] heat/ice application   [] Splint wear/care   [] Sensory re-education   [] scar management      [] other:            Other Objective/Functional Measures: NT    Pain Level (0-10 scale) post treatment: 2    ASSESSMENT/Changes in Function: Pain continues to require education on limitations and progression of therapy secondary to poor choices in trying to force wrist ROM    Patient will continue to benefit from skilled OT services to modify and progress therapeutic interventions to attain remaining goals. [x]  See Plan of Care  []  See progress note/recertification  []  See Discharge Summary         Progress towards goals / Updated goals:  Goal:* Patient will have 60 degrees of left wrist flexion in order to perform hygiene tasks and /or work with tools such as a screwdriver. Status as last note/recert: 50 degrees      Goal:* Patient will have 70 degrees of left forearm supination in order to perform ADL's including washing face and accepting change. Status as last note/recert: 65 degrees     Goal:* Patient will have 70 degrees of left forearm pronation in order to type and utilize computer mouse as well as bear wt thru the hand when pushing up from a chair. Status as last note/recert: 65 degrees     Goal:* Pt will have 40 pounds of  in the left hand to allow for functional grasp for all ADL activities including dressing, bathing and self care.   Status as last note/recert: progressing with functional strengthening tasks.     Goal:* Patient will have improved left lateral pinch strength of  13 pounds in order to perform fine motor tasks such as turning pages, turning ignition and open containers. .     Goal:* Patient will have improved left tip pinch strength of 7 pounds in order to perform fine motor tasks such as zippiing up zippers or opening containers.     Goal:* Patient will have improved left bautista pinch strength of 10 pounds in order to perform fine motor tasks such as writing.   Status as last note/recert: patient performing 10 plus min of pinching and pulling tasks without pain or limitations      PLAN  []  Upgrade activities as tolerated     [x]  Continue plan of care  []  Update interventions per flow sheet       []  Discharge due to:_  []  Other:_      Tino Littlejohn OT 11/26/2019  1:47 PM    Future Appointments   Date Time Provider Ericka Rooney   12/3/2019  1:00 PM Obie [facility]alloh OT [facility]   12/5/2019  1:30 PM [facility]ara[facility]   12/23/2019 10:40 AM DO Alexia Bustillos [address] 68 4

## 2019-12-03 ENCOUNTER — HOSPITAL ENCOUNTER (OUTPATIENT)
Dept: PHYSICAL THERAPY | Age: 54
Discharge: HOME | End: 2019-12-03
Payer: COMMERCIAL

## 2019-12-03 PROCEDURE — 97110 THERAPEUTIC EXERCISES: CPT

## 2019-12-03 NOTE — PROGRESS NOTES
OT DAILY TREATMENT NOTE 10-18    Patient Name: Timbo Soto  Date:12/3/2019  : 1965  [x]  Patient  Verified  Payor: BLUE CROSS / [facility] [place] / Product Type: PPO /    In time:1307  Out time:1400  Total Treatment Time (min): 48  Visit #: 4 of 8    Medicare/BCBS Only   Total Timed Codes (min):  53 1:1 Treatment Time: 48     Treatment Area: Colles' fracture of left radius, subsequent encounter for closed fracture with routine healing [S52.532D]    SUBJECTIVE  Pain Level (0-10 scale): 0  Any medication changes, allergies to medications, adverse drug reactions, diagnosis change, or new procedure performed?: [x] No    [] Yes (see summary sheet for update)  Subjective functional status/changes:   [] No changes reported    OBJECTIVE      53 min Therapeutic Exercise:  [x] See flow sheet :   Rationale: increase ROM and increase strength to improve the patients ability to return to full functional use of the left UE    With   [] TE   [] TA   [] neuro   [] other: Patient Education: [x] Review HEP    [] Progressed/Changed HEP based on:   [] positioning   [] body mechanics   [] transfers   [] heat/ice application   [] Splint wear/care   [] Sensory re-education   [] scar management      [] other:            Other Objective/Functional Measures: NT    Pain Level (0-10 scale) post treatment: 0    ASSESSMENT/Changes in Function: no new complaints reduced pain. Patient will continue to benefit from skilled OT services to modify and progress therapeutic interventions to attain remaining goals. [x]  See Plan of Care  []  See progress note/recertification  []  See Discharge Summary         Progress towards goals / Updated goals:  Goal:* Patient will have 60 degrees of left wrist flexion in order to perform hygiene tasks and /or work with tools such as a screwdriver.   Status as last note/recert: 50 degrees      Goal:* Patient will have 70 degrees of left forearm supination in order to perform ADL's including washing face and accepting change. Status as last note/recert: 65 degrees     Goal:* Patient will have 70 degrees of left forearm pronation in order to type and utilize computer mouse as well as bear wt thru the hand when pushing up from a chair. Status as last note/recert: 65 degrees     Goal:* Pt will have 40 pounds of  in the left hand to allow for functional grasp for all ADL activities including dressing, bathing and self care. Status as last note/recert: progressing with functional strengthening tasks.     Goal:* Patient will have improved left lateral pinch strength of  13 pounds in order to perform fine motor tasks such as turning pages, turning ignition and open containers. .     Goal:* Patient will have improved left tip pinch strength of 7 pounds in order to perform fine motor tasks such as zippiing up zippers or opening containers.     Goal:* Patient will have improved left bautista pinch strength of 10 pounds in order to perform fine motor tasks such as writing.   Status as last note/recert: patient performing 10 plus min of pinching and pulling tasks without pain or limitations      PLAN  []  Upgrade activities as tolerated     [x]  Continue plan of care  []  Update interventions per flow sheet       []  Discharge due to:_  []  Other:_      Isra Soriano OT 12/3/2019  1:47 PM    Future Appointments   Date Time Provider Ericka Rooney   12/5/2019  1:30 PM Judy Meyer OT [id number]   12/23/2019 10:40 AM DO Alexia Salomon Ayo 68

## 2019-12-05 ENCOUNTER — HOSPITAL ENCOUNTER (OUTPATIENT)
Dept: PHYSICAL THERAPY | Age: 54
Discharge: HOME | End: 2019-12-05
Payer: COMMERCIAL

## 2019-12-05 PROCEDURE — 97110 THERAPEUTIC EXERCISES: CPT

## 2019-12-05 NOTE — PROGRESS NOTES
OT DAILY TREATMENT NOTE 10-18    Patient Name: Liset Gusman  Date:2019  : 1965  [x]  Patient  Verified  Payor: BLUE CROSS / Plan: [facility] [place] / Product Type: PPO /    In time:1130  Out time:1215  Total Treatment Time (min): 45  Visit #: 5 of 8    Medicare/BCBS Only   Total Timed Codes (min):  53 1:1 Treatment Time: 48     Treatment Area: Colles' fracture of left radius, subsequent encounter for closed fracture with routine healing [S52.532D]    SUBJECTIVE  Pain Level (0-10 scale): 0  Any medication changes, allergies to medications, adverse drug reactions, diagnosis change, or new procedure performed?: [x] No    [] Yes (see summary sheet for update)  Subjective functional status/changes:   [] No changes reported    OBJECTIVE      45 min Therapeutic Exercise:  [x] See flow sheet :   Rationale: increase ROM and increase strength to improve the patients ability to return to full functional use of the left UE    With   [] TE   [] TA   [] neuro   [] other: Patient Education: [x] Review HEP    [] Progressed/Changed HEP based on:   [] positioning   [] body mechanics   [] transfers   [] heat/ice application   [] Splint wear/care   [] Sensory re-education   [] scar management      [] other:            Other Objective/Functional Measures: NT    Pain Level (0-10 scale) post treatment: 0    ASSESSMENT/Changes in Function: Patient provided with HEP for strenghening will likely DC at next visit    Patient will continue to benefit from skilled OT services to modify and progress therapeutic interventions to attain remaining goals. [x]  See Plan of Care  []  See progress note/recertification  []  See Discharge Summary         Progress towards goals / Updated goals:  Goal:* Patient will have 60 degrees of left wrist flexion in order to perform hygiene tasks and /or work with tools such as a screwdriver.   Status as last note/recert: 50 degrees      Goal:* Patient will have 70 degrees of left forearm supination in order to perform ADL's including washing face and accepting change. Status as last note/recert: 65 degrees     Goal:* Patient will have 70 degrees of left forearm pronation in order to type and utilize computer mouse as well as bear wt thru the hand when pushing up from a chair. Status as last note/recert: 65 degrees     Goal:* Pt will have 40 pounds of  in the left hand to allow for functional grasp for all ADL activities including dressing, bathing and self care. Status as last note/recert: progressing with functional strengthening tasks.     Goal:* Patient will have improved left lateral pinch strength of  13 pounds in order to perform fine motor tasks such as turning pages, turning ignition and open containers. .     Goal:* Patient will have improved left tip pinch strength of 7 pounds in order to perform fine motor tasks such as zippiing up zippers or opening containers.     Goal:* Patient will have improved left bautista pinch strength of 10 pounds in order to perform fine motor tasks such as writing.   Status as last note/recert: patient performing 10 plus min of pinching and pulling tasks without pain or limitations      PLAN  []  Upgrade activities as tolerated     [x]  Continue plan of care  []  Update interventions per flow sheet       []  Discharge due to:_  []  Other:_      Ros Wiseman OT 12/5/2019  1:47 PM    Future Appointments   Date Time Provider Ericka Rooney   12/18/2019  3:00 PM Eyad Sorensen OT [facility]   12/23/2019 10:40 AM DO Alexia Cook 68

## 2019-12-18 ENCOUNTER — HOSPITAL ENCOUNTER (OUTPATIENT)
Dept: PHYSICAL THERAPY | Age: 54
Discharge: HOME | End: 2019-12-18
Payer: COMMERCIAL

## 2019-12-18 PROCEDURE — 97110 THERAPEUTIC EXERCISES: CPT

## 2019-12-18 PROCEDURE — 97535 SELF CARE MNGMENT TRAINING: CPT

## 2019-12-18 NOTE — PROGRESS NOTES
OT DISCHARGE DAILY NOTE AND SUMMARY     Date:2019  Patient name: Mariposa Lyon Start of Care: 10/9/19   Referral source: Viet Karimi DO : 1965   Medical/Treatment Diagnosis: Colles' fracture of left radius, subsequent encounter for closed fracture with routine healing [S52.532D] Onset Date:19     Prior Hospitalization: see medical history Provider#: [id number]   Medications: Verified on Patient Summary List    Comorbidities: depression   Prior Level of Function: Independent without limitations in ADL and IADL activities.     Visits from Start of Care: 12    Missed Visits: 2    Reporting Period : 19 to 19  [x]  Patient  Verified  Payor: BLUE CROSS / Plan: [facility] / Product Type: PPO /    In time:1510  Out time:1528  Total Treatment Time (min): 18  Total Timed Codes (min): 18  1:1 Treatment Time ( only): 18   Visit #: 6 of 8    Treatment Area: Colles' fracture of left radius, subsequent encounter for closed fracture with routine healing [S52.532D]    SUBJECTIVE  Pain Level (0-10 scale): 0/10  Any medication changes, allergies to medications, adverse drug reactions, diagnosis change, or new procedure performed?: [x] No    [] Yes (see summary sheet for update)  Subjective functional status/changes:   [] No changes reported  No new complaints    OBJECTIVE      18 min Therapeutic Exercise:  [x] See flow sheet :Measurements, review of HEP   Rationale: increase ROM and education to improve the patients ability to understand progression toward goals    With   [] TE   [] TA   [] neuro   [] other: Patient Education: [x] Review HEP    [] Progressed/Changed HEP based on:   [] positioning   [] body mechanics   [] transfers   [] heat/ice application   [] Splint wear/care   [] Sensory re-education   [] scar management      [] other:            Other Objective/Functional Measures: Range of Motion:   Elbow/Wrist   Wrist 10/9/19 11/6/19  12/18/19        Flex 15 50  65      Ext 20 60  70        UD             RD             FA              Pro 50 65  70        Sup 35  65 65        Elbow             Flex 135           Ext 0              Strength:   Measurements: Taken with Eduard Dynamometer, in Lbs   Level 2 11/6/19 12/18/19 Date Date Date Date Date   Right 44 55              Left 19  40             Deficit                 Change   +21                  Pinch Measurements: Taken with Pinch Gauge, in Lbs   (hand) 11/6/19 12/18/19 Date Date Date Date   Lateral                Right 16             Left  8 12            Deficit               Change               Pad               Right 9             Left 3 8            Deficit               Change               Onur               Right 11             Left 8  11           Deficit               Change                     Pain Level (0-10 scale) post treatment: 0    Summary of Care:  Goal:* Patient will have 60 degrees of left wrist flexion in order to perform hygiene tasks and /or work with tools such as a screwdriver. Status as last note/recert: 50 degrees  Status at discharge: met        Goal:* Patient will have 70 degrees of left forearm supination in order to perform ADL's including washing face and accepting change. Status as last note/recert: 65 degrees   Status at discharge: partially met    Goal:* Patient will have 70 degrees of left forearm pronation in order to type and utilize computer mouse as well as bear wt thru the hand when pushing up from a chair. Status as last note/recert: 70 degrees  Status at discharge: met     Goal:* Pt will have 40 pounds of  in the left hand to allow for functional grasp for all ADL activities including dressing, bathing and self care. Status as last note/recert: 40#  Status at discharge: met    Goal:* Patient will have improved left lateral pinch strength of  13 pounds in order to perform fine motor tasks such as turning pages, turning ignition and open containers. .  Status as last note/recert: 12  Status at discharge: partially met     Goal:* Patient will have improved left tip pinch strength of 7 pounds in order to perform fine motor tasks such as zippiing up zippers or opening containers. Status as last note/recert: 8     Status at discharge: met    Goal:* Patient will have improved left bautista pinch strength of 10 pounds in order to perform fine motor tasks such as writing.   Status as last note/recert: 11 #  Status at discharge: met     ASSESSMENT/Changes in Function:        PLAN:  [x]Discontinue therapy: [x]Patient has reached or is progressing toward set goals      []Patient is non-compliant or has abdicated      []Due to lack of appreciable progress towards set goals    Thank you for this referral!    Ajith Burroughs OT 12/18/2019 3:03 PM

## 2020-01-30 ENCOUNTER — OFFICE VISIT (OUTPATIENT)
Dept: ORTHOPEDIC SURGERY | Age: 55
End: 2020-01-30

## 2020-01-30 VITALS
HEART RATE: 72 BPM | OXYGEN SATURATION: 92 % | BODY MASS INDEX: 41.75 KG/M2 | HEIGHT: 67 IN | SYSTOLIC BLOOD PRESSURE: 187 MMHG | WEIGHT: 266 LBS | TEMPERATURE: 97.7 F | DIASTOLIC BLOOD PRESSURE: 105 MMHG | RESPIRATION RATE: 14 BRPM

## 2020-01-30 DIAGNOSIS — S52.532D CLOSED COLLES' FRACTURE OF LEFT RADIUS WITH ROUTINE HEALING, SUBSEQUENT ENCOUNTER: Primary | ICD-10-CM

## 2020-01-30 RX ORDER — RISPERIDONE 1 MG/1
1 TABLET, FILM COATED ORAL DAILY
COMMUNITY

## 2020-01-30 RX ORDER — TRAZODONE HYDROCHLORIDE 50 MG/1
100 TABLET ORAL 2 TIMES DAILY
COMMUNITY

## 2020-01-30 RX ORDER — OMEPRAZOLE 10 MG/1
40 CAPSULE, DELAYED RELEASE ORAL DAILY
COMMUNITY

## 2020-01-30 NOTE — PROGRESS NOTES
1. Have you been to the ER, urgent care clinic since your last visit? Hospitalized since your last visit? No    2. Have you seen or consulted any other health care providers outside of the [address] [place] [address] since your last visit? Include any pap smears or colon screening.  No

## 2020-01-30 NOTE — PROGRESS NOTES
Mila Amos is a 53 y.o. female right handed individual, not currently working. Worker's Compensation and legal considerations: not known. Vitals:    01/30/20 1053   BP: (!) 187/105   Pulse: 72   Resp: 14   Temp: 97.7 °F (36.5 °C)   TempSrc: Oral   SpO2: 92%   Weight: 266 lb (120.7 kg)   Height: 5' 7\" (1.702 m)   PainSc:   0 - No pain   PainLoc: Wrist           Chief Complaint   Patient presents with    Wrist Pain     LEFT WRIST PAIN       HPI: Patient comes in today for months status post open reduction internal fixation of her left wrist.  She previously had some pain and difficult range of motion and she has been in therapy for this. She denies any pain today and reports full range of motion in getting back to her lifestyle without difficulty. 6 wk PO HPI: Patient comes in today for follow-up of her left distal radius open reduction internal fixation. She has been doing well and in therapy since her last visit. She is approximately 6 weeks postop. She reports some stiffness with flexion extension however improved from the last visit. She also reports some numbness over the dorsal first webspace. 1st PO HPI: Patient comes in today for her first postoperative visit approximately 2 weeks status post left distal radius open reduction internal fixation. She has been in a splint. She reports the pain has been bad except it was hurting the past couple nights. She denies any numbness or tingling. She has some decreased range of motion due to pain and stiffness.     Initial HPI: Pt comes in today after falling this afternoon and injuring her left wrist.  She was splinted in ED and referred here    Date of onset:  9/19/2019    Injury: Yes: Comment: fall    Prior Treatment:  Yes: Comment: ED Splint    Numbness/ Tingling: No    ROS: Review of Systems - General ROS: negative  Respiratory ROS: no cough, shortness of breath, or wheezing  Cardiovascular ROS: no chest pain or dyspnea on exertion  Musculoskeletal ROS: positive for - pain in wrist - left  Neurological ROS: negative  Dermatological ROS: negative    History reviewed. No pertinent past medical history. Past Surgical History:   Procedure Laterality Date    HX HYSTERECTOMY      HX PELVIC LAPAROSCOPY         Current Outpatient Medications   Medication Sig Dispense Refill    omeprazole (PRILOSEC) 10 mg capsule Take 10 mg by mouth daily.  traZODone (DESYREL) 50 mg tablet Take  by mouth nightly.  risperiDONE (RISPERDAL) 1 mg tablet Take  by mouth daily.  ibuprofen (MOTRIN) 800 mg tablet Take 1 Tab by mouth every eight (8) hours as needed for Pain. 30 Tab 0    ALPRAZolam (XANAX) 0.5 mg tablet Take  by mouth two (2) times a day. Indications: anxious      ondansetron (ZOFRAN ODT) 4 mg disintegrating tablet Take 1 Tab by mouth every eight (8) hours as needed for Nausea. 12 Tab 0       No Known Allergies      PE:     Left Wrist: Incision has completely healed and there is no tenderness to palpation about the incision. Patient has full range of motion with no difficulty. Sensation is grossly intact distally. Imaging:     Plain films of the left wrist show a mostly healed distal radius fracture with plate and screws in appropriate position. There is also an ulnar styloid fracture noted with no displacement. There is no hardware loosening. The second most ulnar screw distally on the AP appears to go distal into the joint however on the lateral it does not go into the joint and is dorsal to the joint. ICD-10-CM ICD-9-CM    1. Closed Colles' fracture of left radius with routine healing, subsequent encounter S52.532D V54.12 AMB POC XRAY, WRIST; COMPLETE, 3+ VIE       Plan:     Continue range of motion exercises as needed.     Follow-up PRN    Plan was reviewed with patient, who verbalized agreement and understanding of the plan

## 2020-05-07 NOTE — H&P
H&P Update:  Ling Hoskins was seen and examined. History and physical has been reviewed. The patient has been examined.  There have been no significant clinical changes since the completion of the originally dated History and Physical. intact

## 2022-03-19 PROBLEM — E66.01 OBESITY, MORBID (HCC): Status: ACTIVE | Noted: 2019-09-19

## 2022-05-16 ENCOUNTER — TRANSCRIBE ORDER (OUTPATIENT)
Dept: SCHEDULING | Age: 57
End: 2022-05-16

## 2022-05-16 DIAGNOSIS — F17.211 CIGARETTE NICOTINE DEPENDENCE IN REMISSION: Primary | ICD-10-CM

## 2022-05-18 ENCOUNTER — HOSPITAL ENCOUNTER (OUTPATIENT)
Dept: GENERAL RADIOLOGY | Age: 57
Discharge: HOME | End: 2022-05-18
Payer: COMMERCIAL

## 2022-05-18 DIAGNOSIS — M79.673 FOOT PAIN: ICD-10-CM

## 2022-05-18 PROCEDURE — 73630 X-RAY EXAM OF FOOT: CPT

## 2022-06-17 ENCOUNTER — HOSPITAL ENCOUNTER (OUTPATIENT)
Dept: CT IMAGING | Age: 57
Discharge: HOME | End: 2022-06-17
Attending: FAMILY MEDICINE
Payer: COMMERCIAL

## 2022-06-17 DIAGNOSIS — F17.211 CIGARETTE NICOTINE DEPENDENCE IN REMISSION: ICD-10-CM

## 2022-06-17 PROCEDURE — 71271 CT THORAX LUNG CANCER SCR C-: CPT

## 2022-07-25 PROBLEM — R51.9 HEADACHE: Status: ACTIVE | Noted: 2022-07-25

## 2022-07-25 PROBLEM — T17.908A ASPIRATION INTO AIRWAY: Status: ACTIVE | Noted: 2022-07-25

## 2022-07-25 PROBLEM — F17.200 TOBACCO DEPENDENCE: Status: ACTIVE | Noted: 2022-07-25

## 2022-07-25 PROBLEM — J98.11 ATELECTASIS: Status: ACTIVE | Noted: 2022-07-25

## 2022-07-25 PROBLEM — K31.1 PYLORIC STENOSIS IN ADULT: Status: ACTIVE | Noted: 2022-07-25

## 2022-07-25 PROBLEM — K21.9 GERD (GASTROESOPHAGEAL REFLUX DISEASE): Status: ACTIVE | Noted: 2022-07-25

## 2022-07-25 PROBLEM — J06.9 URI (UPPER RESPIRATORY INFECTION): Status: ACTIVE | Noted: 2022-07-25

## 2022-07-26 PROBLEM — K31.1 PYLORIC OBSTRUCTION: Status: ACTIVE | Noted: 2022-07-26

## 2022-07-26 PROBLEM — K20.90 ESOPHAGITIS: Status: ACTIVE | Noted: 2022-07-26

## 2022-08-02 PROBLEM — K31.1 GASTRIC OUT LET OBSTRUCTION: Status: ACTIVE | Noted: 2022-08-02

## 2022-08-03 PROBLEM — K31.1 GASTRIC OUTLET OBSTRUCTION: Status: ACTIVE | Noted: 2022-08-03

## 2022-08-24 PROBLEM — J06.9 URI (UPPER RESPIRATORY INFECTION): Status: RESOLVED | Noted: 2022-07-25 | Resolved: 2022-08-24

## 2022-09-28 ENCOUNTER — OFFICE VISIT (OUTPATIENT)
Dept: PULMONOLOGY | Age: 57
End: 2022-09-28
Payer: COMMERCIAL

## 2022-09-28 VITALS
BODY MASS INDEX: 39.05 KG/M2 | HEART RATE: 80 BPM | SYSTOLIC BLOOD PRESSURE: 132 MMHG | OXYGEN SATURATION: 93 % | TEMPERATURE: 97.6 F | HEIGHT: 67 IN | RESPIRATION RATE: 18 BRPM | WEIGHT: 248.8 LBS | DIASTOLIC BLOOD PRESSURE: 82 MMHG

## 2022-09-28 DIAGNOSIS — R05.9 COUGH IN ADULT: ICD-10-CM

## 2022-09-28 DIAGNOSIS — R91.1 LUNG NODULE: ICD-10-CM

## 2022-09-28 DIAGNOSIS — R93.89 ABNORMAL CT OF THE CHEST: ICD-10-CM

## 2022-09-28 DIAGNOSIS — J43.2 CENTRILOBULAR EMPHYSEMA (HCC): Primary | ICD-10-CM

## 2022-09-28 PROCEDURE — 99204 OFFICE O/P NEW MOD 45 MIN: CPT | Performed by: INTERNAL MEDICINE

## 2022-09-28 NOTE — PROGRESS NOTES
Eder Smith presents today for   Chief Complaint   Patient presents with    Lung Nodule       Is someone accompanying this pt?      Is the patient using any DME equipment during [address]?     -DME Company NA    Depression Screening:  3 most recent \[facility]\"" 36 Screens 9/28/2022   Little interest or pleasure in doing things Not at all   Feeling down, depressed, irritable, or hopeless Not at all   Total Score PHQ 2 0       Learning Assessment:  No flowsheet data found. Abuse Screening:  No flowsheet data found. Fall Risk  No flowsheet data found. Coordination of Care:  1. Have you been to the ER, urgent care clinic since your last visit? Hospitalized since your last visit? No    2. Have you seen or consulted any other health care providers outside of the [address] [place] [address] since your last visit? Include any pap smears or colon screening.  No

## 2022-09-28 NOTE — Clinical Note
Hey, saw a mutual patient in clinic. Noted Right adrenal mass on LDCT. Just wanted to bring this to your attention.

## 2022-09-28 NOTE — PROGRESS NOTES
[facility]                                                       Pulmonary, Critical Care, and Sleep Medicine      Pulmonary Office Initial Consultation. Name: Kimberlyn Cheek     :      Date: 2022        Subjective:   Chief complaint: Lung nodule. Patient is a 61 y.o. female GERD, gastric outlet obstruction, morbid obesity and tobacco use. Today in clinic, she states she is here to review her CT scan. She is an active smoker; had quit until  when she had some family issues happen. Then she resumed smoking. Planning to quit again on 2022. She admits to dyspnea which is worsened by climbing a flight of stairs. She is able to walk ~250 steps before needing to stop and catch her breath. It takes her about 5 minutes to catch her breath. She admits to intermittent cough with phlegm production. No wheezing, fever, chills, chest pain, or night sweats. Notes weight gain of ~10 Lbs in the past 3 months. Denies prior history of Asthma. She denies history of seasonal allergies. States she was given a Ventolin inhaler which lead to significant improvement in her SOB. Tobacco/Substance/Vape/Hookah: smoked 1/2 ppd x35 years. Admits to smoking Marijuana once daily. No vape or hookah use. Occupation: retired; [employer]. 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 States likely these paints had asbestos in them. Travel: None  Pets: Birds - coccaktoo (12 yrs) and Episcopal parrot (16 years). Dog - no issues with her breathing around the pet.   TB exposure/testing: no known exposures  VTE/DVT history: none   service: none  Hobbies: spending time with mother  Other exposures: notes exposure to chickens as a child  Autoimmune disease: none in self  Influenza vaccination: declined  COVID-19 (+) testing: no prior history  COVID-19 vaccination: declined    Past Medical History: Diagnosis Date    Diabetes ([facility] [address]     pre    Endometriosis     GERD (gastroesophageal reflux disease)        Past Surgical History:   Procedure Laterality Date    HX  SECTION      HX CHOLECYSTECTOMY      HX HYSTERECTOMY      HX PELVIC LAPAROSCOPY         Social History     Socioeconomic History    Marital status:    Tobacco Use    Smoking status: Every Day     Packs/day: 0.25     Years: 30.00     Pack years: 7.50     Types: Cigarettes     Passive exposure: Never    Smokeless tobacco: Never   Substance and Sexual Activity    Alcohol use: Yes     Comment: weekends beer    Drug use: Yes     Types: Marijuana     Comment: occas       Family History   Problem Relation Age of Onset    Hypertension Mother        No Known Allergies    Current Outpatient Medications   Medication Sig Dispense Refill    metFORMIN (GLUCOPHAGE) 500 mg tablet Take 500 mg by mouth every evening. venlafaxine (EFFEXOR) 100 mg tablet Take 150 mg by mouth two (2) times a day. omeprazole (PRILOSEC) 10 mg capsule Take 40 mg by mouth in the morning. traZODone (DESYREL) 50 mg tablet Take 100 mg by mouth two (2) times a day. risperiDONE (RisperDAL) 1 mg tablet Take 1 mg by mouth in the morning. ALPRAZolam (XANAX) 0.5 mg tablet Take 0.5 mg by mouth three (3) times daily as needed.  Indications: anxious           Review of Systems:  HEENT: No epistaxis, no nasal drainage, no difficulty in swallowing, no redness in eyes  Respiratory: as above  Cardiovascular: no chest pain, no palpitations, no chronic leg edema, no syncope  Gastrointestinal: no abd pain, no vomiting, no diarrhea, no bleeding symptoms  Genitourinary: No urinary symptoms or hematuria  Integument/breast: No ulcers or rashes  Musculoskeletal:Neg  Neurological: No focal weakness, no seizures, no headaches  Behvioral/Psych: No anxiety, no depression  Constitutional: As above    Objective:   Visit Vitals  /82 (BP 1 Location: Left lower arm, BP Patient Position: Sitting, BP Cuff Size: Adult)   Pulse 80   Temp 97.6 °F (36.4 °C) (Temporal)   Resp 18   Ht 5' 7\" (1.702 m)   Wt 112.9 kg (248 lb 12.8 oz)   SpO2 93%   BMI 38.97 kg/m²        Physical Exam:   General: comfortable, no acute distress  HEENT: pupils reactive, sclera anicteric, EOM intact, moist mucus membranes. Neck: No adenopathy or thyroid swelling, no lymphadenopathy or JVD, supple  CVS: S1S2 no murmurs  RS: Clear to auscultation bilaterally. No wheezes or rhonchi. No tachypnea or  Abd: soft, non tender, no hepatosplenomegaly, BS normal  Neuro: non focal, awake, alert  Extrm: no leg edema, clubbing or cyanosis  Skin: no rash on exposed skin    Data review:   Pertinent labs: CBC, BMP, LFT's  Serologies (ILD, SIMONE): n/a  IgE: n/a  Peripheral Eos: 200  Allergy panel: n/a  Alpha-1 antitrypsin: n/a  ACE level: n/a    PFT: n/a  Six Minute Walk Test: n/a    Echocardiogram  Date: n/a    Imaging:  I have personally reviewed the patients radiographs and have reviewed the reports:  XR Results (most recent):  Results from Hospital Encounter encounter on 07/25/22    XR CHEST SNGL V    Narrative  INDICATION: r/o pneumonia. Dyspnea  COMPARISON: 7/25/2022  TECHNIQUE: Single view chest.    Impression  IMPRESSION:  Heart size is normal. Mild bibasilar atelectatic change. No acute infiltrates. CT Results (most recent):  Results from Hospital Encounter encounter on 06/17/22    CT LOW DOSE LUNG CANCER SCREENING    Narrative  CT CHEST LUNG SCREENING    Indication: Current smoker, 0.25 packs per day. Greater than 30 year smoking  history. Technique: Low-dose computed tomography acquisition performed according to the  national comprehensive cancer network guidelines space on body mass index. Axial  raw images obtained. Reconstruction on lung windows and soft tissue windows with  additional coronal and sagittally reformatted series.  No intravenous contrast  administered for this exam.    All CT scans at this facility are performed using dose optimization technique as  appropriate to a performed exam, to include automated exposure control,  adjustment of the mA and/or kV according to patient size (including appropriate  matching first site-specific examinations), or use of iterative reconstruction  technique. Comparison: None    Chest findings:    Thyroid grossly normal. Heart size is normal. No aortic aneurysm. No thoracic  lymphadenopathy. No pleural effusion or pneumothorax. Mild central bronchial  wall thickening. Mild emphysema. There are a few peripheral nodules in the right upper lobe measuring between 2-6  mm (series 3, image [id number]). No additional suspicious nodules. No prior imaging  for comparison. Cholecystectomy. Up to 3.1 cm apparent right adrenal nodule which appears to be  low density but is suboptimally evaluated with low-dose technique. No prior  imaging for comparison. Pancreas is atrophic. Degenerative changes bilateral glenohumeral joints. Degenerative disc disease. Impression  1. A few up to 6 mm peripheral nodules in the right upper lobe, some of which  are clustered favoring infectious/inflammatory process, likely atypical  infection such as MAC/GLENN. Lung RADS 3S: Probably benign. Management: Six-month  low-dose chest CT. 2.  Mild bronchitis and emphysema. 3.  Apparent 3.1 cm right adrenal nodule which appears to be low density but is  suboptimally evaluated with low-dose technique. Recommend adrenal CT as  follow-up.        Patient Active Problem List   Diagnosis Code    Obesity, morbid ([facility] [address] E66.01    Aspiration into airway T17.908A    Pyloric stenosis in adult K31.1    Headache R51.9    Atelectasis J98.11    GERD (gastroesophageal reflux disease) K21.9    URI (upper respiratory infection) J06.9    Tobacco dependence F17.200    Esophagitis K20.90    Pyloric obstruction K31.1    Gastric out let obstruction K31.1    Gastric outlet obstruction K31.1     IMPRESSION:   COPD: Mild centrilobular emphysema noted on LDCT. Suspect secondary to history of tobacco abuse. No prior PFT available for review. Multiple pulmonary nodules: Largest nodule measuring 6 mm in RUL. Suspect secondary to environmental exposures (bird exposure). Doubt malignancy, but cannot be excluded at this time. Tobacco abuse: Active smoker; 17.5 pack year history. H/o asbestos exposure  Adrenal mass: Noted 3.1 cm right adrenal mass on LDCT. Etiology unknown. Recommend further evaluation; will defer to PCP. RECOMMENDATIONS:   Continue albuterol 1 to 2 puffs every 4-6 hours as needed shortness of breath  Obtain complete PFT and 6-minute walk  Lab work-allergen panel, IgE, Fungitell, HP panel, Quant-TB  Obtain sputum culture and AFB  Repeat CT chest for reevaluation of pulmonary nodules ordered  Pulmonary rehab-we will determine pending above work-up  Cessation counseling provided  Vaccination: Recommend all age-appropriate immunizations. Recommend COVID-19 vaccination. LCS: Not a candidate due to pack-year history. Follow up -3 months & prn.     Education:  Inhaler techniques, MDI/spacers, nebulizers use, goal setting, monitoring  Reducing anxiety, energy conservation tips, exercise, medications and Nutrition. Smoking cessation  The patient was counseled on the dangers of tobacco use, and was advised to quit and reluctant to quit. Reviewed strategies to maximize success, including removing cigarettes and smoking materials from environment, stress management, substitution of other forms of reinforcement, support of family/friends, and pharmacotherapy (nicotine patches). A total of 5 minutes was spent on this discussion. Health maintenance screens deferred to Primary care provider.      Ru Graham,   9/28/2022  Pulmonary, Critical Care Medicine  [address] [place] [address] [facility]

## 2022-09-28 NOTE — LETTER
10/7/2022    Patient: Adela Garcia   YOB: 1965   Date of Visit: 9/28/2022     Angelia Davis MD  [address] [address]  Via Fax: [phone]    Dear Angelia Davis MD,      Thank you for referring Ms. Keri Montague to [address] [place] [address] for evaluation. My notes for this consultation are attached. If you have questions, please do not hesitate to call me. I look forward to following your patient along with you.       Sincerely,    Lg Watson, DO

## 2022-09-28 NOTE — PATIENT INSTRUCTIONS
What is the plan?  -Continue Albuterol rescue inhaler as needed for shortness of breath   -complete lung function test and walk test  -complete labwork  -repeat CAT/CT scan in December, 2022.        -Recommend discussion with your primary care provider about getting a CAT/CT scan of your abdomen to check your adrenal glands.  -Recommend avoidance and stay away from your the birds    -Recommend getting vaccinated against COVID-19 infection         Chronic Obstructive Pulmonary Disease (COPD): Care Instructions  Overview     Chronic obstructive pulmonary disease (COPD) is a lung disease that makes it hard to breathe. With COPD, the airways that lead to the lungs are narrowed, and the tiny air sacs in the lungs are damaged and lose their stretch. People with COPD have decreased airflow in and out of the lungs, which makes it hard to breathe. The airways also can get clogged with thick mucus. Cigarette smoking is a major cause of COPD. Although there is no cure for COPD, you can slow its progress. Following your treatment plan and taking care of yourself can help you feel better and live longer. Follow-up care is a key part of your treatment and safety. Be sure to make and go to all appointments, and call your doctor if you are having problems. It's also a good idea to know your test results and keep a list of the medicines you take. How can you care for yourself at home? Staying healthy    Do not smoke. This is the most important step you can take to prevent more damage to your lungs. If you need help quitting, talk to your doctor about stop-smoking programs and medicines. These can increase your chances of quitting for good. Avoid colds and other infections. Get the pneumococcal and whooping cough (pertussis) vaccines. If you have had these vaccines before, ask your doctor if you need another dose. Get the flu vaccine every fall. If you must be around people with colds or the flu, wash your hands often. Avoid secondhand smoke and air pollution. Try to stay inside with your windows closed when air pollution is bad. Medicines and oxygen therapy    Take your medicines exactly as prescribed. Call your doctor if you think you are having a problem with your medicine. You may be taking medicines such as:  Bronchodilators. These help open your airways and make breathing easier. They are either short-acting (work for 4 to 9 hours) or long-acting (work for 12 to 24 hours). You inhale most bronchodilators, so they start to act quickly. Always carry your quick-relief inhaler with you in case you need it. Corticosteroids (prednisone, budesonide). These reduce airway inflammation. They come in inhaled or pill form. Ask your doctor or pharmacist if you are using each type of inhaler correctly. With correct use, the medicine is more likely to get to your lungs. See if a spacer is right for you. A spacer may also help you get more inhaled medicine to your lungs. If you use one, ask how to use it properly. Do not take any vitamins, over-the-counter medicine, or herbal products without talking to your doctor first.     If your doctor prescribed antibiotics, take them as directed. Do not stop taking them just because you feel better. You need to take the full course of antibiotics. If you use oxygen therapy, use the flow rate your doctor has recommended. Don't change it without talking to your doctor first. Oxygen therapy boosts the amount of oxygen in your blood and helps you breathe easier. Activity    Get regular exercise. Walking is an easy way to get exercise. Start out slowly, and walk a little more each day. Pay attention to your breathing. You are exercising too hard if you can't talk while you exercise. Take short rest breaks when doing household chores and other activities. Learn breathing methods--such as breathing through pursed lips--to help you become less short of breath.      If your doctor has not set you up with a pulmonary rehabilitation program, ask if rehab is right for you. Rehab includes exercise programs, education about your disease and how to manage it, help with diet and other changes, and emotional support. Diet    Eat regular, healthy meals. Use bronchodilators about 1 hour before you eat to make it easier to eat. Eat several smaller, frequent meals to prevent getting too full. A full stomach can push on the muscle that helps you breathe (your diaphragm) and make it harder to breathe. Drink beverages at the end of the meal.     Avoid foods that are hard to chew. Eat foods that contain protein so you don't lose muscle mass. Talk with your doctor if you gain too much weight or if you lose weight without trying. Mental health    Talk to your family, friends, or a therapist about your feelings. Some people feel frightened, angry, hopeless, helpless, and even guilty. Talking openly about feelings may help you cope. If these feelings last, talk to your doctor. When should you call for help? Call 911 anytime you think you may need emergency care. For example, call if:    You have severe trouble breathing. You are having chest pain that is different or worse than usual.   Call your doctor now or seek immediate medical care if:    You have new or worse trouble breathing. You cough up blood. You have a fever. You have feelings of anxiety or depression. Watch closely for changes in your health, and be sure to contact your doctor if:    You cough more deeply or more often, especially if you notice more mucus or a change in the color of your mucus. You have new or worse swelling in your legs or belly. You are not getting better as expected. Where can you learn more?   Go to http://www.gray.com/  Enter C218 in the search box to learn more about \"Chronic Obstructive Pulmonary Disease (COPD): Care Instructions. \"  Current as of: July 6, 2021               Content Version: 13.2  © 6523-5544 Healthwise, [facility]. Care instructions adapted under license by [employer] (which disclaims liability or warranty for this information). If you have questions about a medical condition or this instruction, always ask your healthcare professional. [place] [address] any warranty or liability for your use of this information.

## 2022-10-11 ENCOUNTER — HOSPITAL ENCOUNTER (OUTPATIENT)
Dept: LAB | Age: 57
Discharge: HOME | End: 2022-10-11
Payer: COMMERCIAL

## 2022-10-11 DIAGNOSIS — R05.9 COUGH IN ADULT: ICD-10-CM

## 2022-10-11 DIAGNOSIS — R93.89 ABNORMAL CT OF THE CHEST: ICD-10-CM

## 2022-10-11 PROCEDURE — 87449 NOS EACH ORGANISM AG IA: CPT

## 2022-10-11 PROCEDURE — 86480 TB TEST CELL IMMUN MEASURE: CPT

## 2022-10-11 PROCEDURE — 36415 COLL VENOUS BLD VENIPUNCTURE: CPT

## 2022-10-11 PROCEDURE — 86606 ASPERGILLUS ANTIBODY: CPT

## 2022-10-11 PROCEDURE — 82785 ASSAY OF IGE: CPT

## 2022-10-11 PROCEDURE — 86003 ALLG SPEC IGE CRUDE XTRC EA: CPT

## 2022-10-14 LAB
1,3 BETA GLUCAN SER-MCNC: <31 PG/ML
A FUMIGATUS1 AB SER QL ID: NEGATIVE
A PULLULANS AB SER QL: NEGATIVE
LACEYELLA SACCHARI AB SER QL: NEGATIVE
PIGEON SERUM AB QL ID: NEGATIVE
S RECTIVIRGULA IGG SER QL ID: NEGATIVE
T VULGARIS AB SER QL ID: NEGATIVE

## 2022-10-16 LAB
M TB IFN-G BLD-IMP: NEGATIVE
QUANTIFERON CRITERIA, QFI1T: NORMAL
QUANTIFERON MITOGEN VALUE: 2.82 IU/ML
QUANTIFERON NIL VALUE: 0.01 IU/ML
QUANTIFERON TB1 AG: 0.02 IU/ML
QUANTIFERON TB2 AG: 0.02 IU/ML

## 2022-10-18 LAB
A ALTERNATA IGE QN: <0.1 KU/L
A FUMIGATUS IGE QN: <0.1 KU/L
AMER ROACH IGE QN: <0.1 KU/L
AMER SYCAMORE IGE QN: <0.1 KU/L
BAHIA GRASS IGE QN: <0.1 KU/L
BERMUDA GRASS IGE QN: <0.1 KU/L
BOXELDER IGE QN: <0.1 KU/L
C HERBARUM IGE QN: <0.1 KU/L
CAT DANDER IGG QN: <0.1 KU/L
CLASS DESCRIPTION, 600268: NORMAL
COMMON RAGWEED IGE QN: <0.1 KU/L
D FARINAE IGE QN: <0.1 KU/L
D PTERONYSS IGE QN: <0.1 KU/L
DEPRECATED IGE QN: <0.1 KU/L
DOG DANDER IGE QN: <0.1 KU/L
ENGL PLANTAIN IGE QN: <0.1 KU/L
IGE SERPL-ACNC: 48 IU/ML (ref 6–495)
JOHNSON GRASS IGE QN: <0.1 KU/L
M RACEMOSUS IGE QN: <0.1 KU/L
MT JUNIPER IGE QN: <0.1 KU/L
MUGWORT IGE QN: <0.1 KU/L
NETTLE IGE QN: <0.1 KU/L
P NOTATUM IGE QN: <0.1 KU/L
S BOTRYOSUM IGE QN: <0.1 KU/L
SHEEP SORREL IGE QN: <0.1 KU/L
SWEET GUM IGE QN: <0.1 KU/L
TIMOTHY IGE QN: <0.1 KU/L
WHITE BIRCH IGE QN: <0.1 KU/L
WHITE ELM IGG QN: <0.1 KU/L
WHITE HICKORY IGE QN: <0.1 KU/L
WHITE MULBERRY IGE QN: <0.1 KU/L
WHITE OAK IGE QN: <0.1 KU/L

## 2022-11-16 ENCOUNTER — TRANSCRIBE ORDER (OUTPATIENT)
Dept: SCHEDULING | Age: 57
End: 2022-11-16

## 2022-11-16 DIAGNOSIS — E27.8 RIGHT ADRENAL MASS (HCC): Primary | ICD-10-CM

## 2022-12-28 ENCOUNTER — HOSPITAL ENCOUNTER (OUTPATIENT)
Dept: CT IMAGING | Age: 57
Discharge: HOME | End: 2022-12-28
Attending: INTERNAL MEDICINE
Payer: COMMERCIAL

## 2022-12-28 DIAGNOSIS — R91.1 LUNG NODULE: ICD-10-CM

## 2022-12-28 PROCEDURE — 71250 CT THORAX DX C-: CPT

## 2023-02-23 ENCOUNTER — CLINICAL DOCUMENTATION (OUTPATIENT)
Age: 58
End: 2023-02-23

## 2023-02-23 NOTE — PROGRESS NOTES
Spoke to patient 2/17/23, Upon my return to work called patient on 2/23/23 to schedule   Followup Appt from 9/28/22 with PFT [facility] and [facility]. Phone# [phone] not working and [phone] doesn't ring at all.

## 2023-03-23 ENCOUNTER — PROCEDURE VISIT (OUTPATIENT)
Age: 58
End: 2023-03-23

## 2023-03-23 VITALS
WEIGHT: 261 LBS | BODY MASS INDEX: 40.97 KG/M2 | HEIGHT: 67 IN | OXYGEN SATURATION: 95 % | TEMPERATURE: 98 F | SYSTOLIC BLOOD PRESSURE: 188 MMHG | DIASTOLIC BLOOD PRESSURE: 90 MMHG | HEART RATE: 71 BPM

## 2023-03-23 DIAGNOSIS — R91.1 SOLITARY PULMONARY NODULE: Primary | ICD-10-CM

## 2023-03-29 ENCOUNTER — OFFICE VISIT (OUTPATIENT)
Age: 58
End: 2023-03-29
Payer: COMMERCIAL

## 2023-03-29 VITALS
HEIGHT: 67 IN | DIASTOLIC BLOOD PRESSURE: 100 MMHG | WEIGHT: 257.9 LBS | HEART RATE: 78 BPM | SYSTOLIC BLOOD PRESSURE: 148 MMHG | TEMPERATURE: 96.7 F | BODY MASS INDEX: 40.48 KG/M2 | OXYGEN SATURATION: 98 % | RESPIRATION RATE: 18 BRPM

## 2023-03-29 DIAGNOSIS — R94.2 ABNORMAL PFT: ICD-10-CM

## 2023-03-29 DIAGNOSIS — R06.09 DYSPNEA ON EXERTION: ICD-10-CM

## 2023-03-29 DIAGNOSIS — J43.2 CENTRILOBULAR EMPHYSEMA (HCC): Primary | ICD-10-CM

## 2023-03-29 DIAGNOSIS — R06.81 WITNESSED EPISODE OF APNEA: ICD-10-CM

## 2023-03-29 DIAGNOSIS — R06.83 SNORING: ICD-10-CM

## 2023-03-29 DIAGNOSIS — J98.4 RESTRICTIVE LUNG DISEASE: ICD-10-CM

## 2023-03-29 PROCEDURE — 99213 OFFICE O/P EST LOW 20 MIN: CPT | Performed by: INTERNAL MEDICINE

## 2023-03-29 RX ORDER — FLUTICASONE PROPIONATE AND SALMETEROL 250; 50 UG/1; UG/1
1 POWDER RESPIRATORY (INHALATION) EVERY 12 HOURS
Qty: 60 EACH | Refills: 3 | Status: CANCELLED | OUTPATIENT
Start: 2023-03-29

## 2023-03-29 RX ORDER — FLUTICASONE PROPIONATE AND SALMETEROL 250; 50 UG/1; UG/1
1 POWDER RESPIRATORY (INHALATION) EVERY 12 HOURS
Qty: 60 EACH | Refills: 3 | Status: SHIPPED | OUTPATIENT
Start: 2023-03-29

## 2023-03-29 RX ORDER — ALBUTEROL SULFATE 90 UG/1
2 AEROSOL, METERED RESPIRATORY (INHALATION) EVERY 6 HOURS PRN
Qty: 18 G | Refills: 3 | Status: SHIPPED | OUTPATIENT
Start: 2023-03-29

## 2023-03-29 NOTE — PATIENT INSTRUCTIONS
What is the plan?  -Start on Advair - rinse mouth after each use  -Start on Albuterol rescue inhaler - only as needed for shortness of breath   -Complete Echocardiogram/ultrasound of your heart    -Increase activity as much as able and to lose weight.  -Start on Pulmonary rehabilitation.    -Stop use of All tobacco products. Tips for Good Sleep Hygiene   The most important sleep hygiene measure is to maintain a regular sleep and wake pattern seven days a week. Set an alarm and wake up at the same time every day. Avoid napping during the day; it can disturb the normal pattern of sleep and wakefulness. Avoid stimulants such as caffeine (inclding soda and some chocolates), nicotine, and alcohol too close to bedtime. While alcohol is well known to speed the onset of sleep, it disrupts sleep in the second half as the effect of alcohol vanishes. This can make you wake up in the middle of the night spontaneously. Alcohol also worsens sleep apnea. Food: Stay away from large meals close to bedtime. Also dietary changes can cause sleep problems for example, spicy food, particularly in people with acid reflux. Exercise: Vigorous exercise should be done in the morning or late afternoon. Avoid exercise close to bedtime. Establish a regular relaxing bedtime routine. Try to avoid emotionally upsetting conversations and activities before trying to go to sleep. Don't dwell on, or bring your problems to bed. Associate your bed with sleep. It's not a good idea to use your bed to watch TV, listen to the radio, or read or any other activities other than sleep and sex! Make sure that the sleep environment is pleasant and relaxing. Your bedroom should be cool and dark. Sleep stimulus therapy: Don't spend too much time in bed tossing and turning or thinking or having racing thoughts.  If you are not sleepy in 20-30 minutes (don't look at clock for that~this is just a guess!) get out of bed, go to a different room, and

## 2023-03-29 NOTE — PROGRESS NOTES
Recommend all age-appropriate immunizations. LCS: Not a candidate due to pack-year history. Follow up - 4 months & prn.     Education:  Inhaler techniques, MDI/spacers, nebulizers use, goal setting, monitoring  Reducing anxiety, energy conservation tips, exercise, medications and Nutrition    Health maintenance screens deferred to Primary care provider.      Jimmy Campbell, DO  3/29/2023  Pulmonary, Critical Care Medicine  [facility]

## 2023-07-17 ENCOUNTER — OFFICE VISIT (OUTPATIENT)
Age: 58
End: 2023-07-17
Payer: COMMERCIAL

## 2023-07-17 VITALS
RESPIRATION RATE: 18 BRPM | BODY MASS INDEX: 40.25 KG/M2 | HEART RATE: 78 BPM | HEIGHT: 67 IN | DIASTOLIC BLOOD PRESSURE: 78 MMHG | SYSTOLIC BLOOD PRESSURE: 168 MMHG | OXYGEN SATURATION: 97 % | TEMPERATURE: 98.4 F

## 2023-07-17 DIAGNOSIS — Z72.0 TOBACCO ABUSE: ICD-10-CM

## 2023-07-17 DIAGNOSIS — R06.83 SNORING: ICD-10-CM

## 2023-07-17 DIAGNOSIS — J43.2 CENTRILOBULAR EMPHYSEMA (HCC): Primary | ICD-10-CM

## 2023-07-17 DIAGNOSIS — J98.4 RESTRICTIVE LUNG DISEASE: ICD-10-CM

## 2023-07-17 PROCEDURE — 99213 OFFICE O/P EST LOW 20 MIN: CPT | Performed by: INTERNAL MEDICINE

## 2023-07-17 NOTE — PROGRESS NOTES
Zeferino Lopez presents today for No chief complaint on file. Is someone accompanying this pt? No    Is the patient using any DME equipment during OV? NA    -DME Company No    Depression Screening:    PHQ-9 Questionaire 9/28/2022   Little interest or pleasure in doing things 0   Feeling down, depressed, or hopeless 0   PHQ-9 Total Score 0       Learning Assessment:    No flowsheet data found. Abuse Screening:    No flowsheet data found. Fall Risk    No flowsheet data found. Coordination of Care:    1. Have you been to the ER, urgent care clinic since your last visit? Hospitalized since your last visit? No    2. Have you seen or consulted any other health care providers outside of the [address] [place] [address] since your last visit? Include any pap smears or colon screening. No    Medication list has been update per patient.

## 2023-07-17 NOTE — PATIENT INSTRUCTIONS
What is the plan?  -Start on [employer] as prescribed  -Continue Albuterol rescue inhaler  -Start Pulmonary rehabilitation  -Think more about getting a sleep study; let me know if you change your mind    -Stop use of all tobacco products

## 2023-07-17 NOTE — PROGRESS NOTES
[facility]                                                       Pulmonary, Critical Care, and Sleep Medicine      Pulmonary Office progress notes. Name: Morteza Joshi     : 1965     Date: 2023        Subjective:   Chief complaint: Lung nodule  Patient is a 61 y.o. female with a PMHx of GERD, gastric outlet obstruction, morbid obesity and tobacco use. HPI(23): Today in clinic, she states that her breathing is worsened by climbing the stairs and washing dishes. Takes her a few minutes sitting down to catch her breath. No cough, fever, chills, chest tightness, or night sweats. Notes continued intermittent bilateral leg edema. Resolves with elevating her legs. States she is unable to take the Advair because it gives her a headache. Using Albuterol rescue inhaler 1-2x/day. Tobacco - smoking 1/2 ppd. States that she is using it as a crutch since  has been going through a lot recently. She declines nicotine patches. Exercise - doing household chores. UT - did not get a chance to talk with \pulmonary rehab team.    Sleep - she does not wish to complete sleep study. Of note,  recently diagnosed with colon cancer and underwent surgical resection. HPI (3/29/23): Today in clinic, she states that she has been feeling sluggish. Notes that she gets winded easily. Symptoms worse with bending over to  items. Admits to a cough which is nonproductive. No hemoptysis. Admits night time wheezing as well. Admits to weight gain of ~30 Lbs in the past 3 months. States may be due stress from her 's ongoing medical issues. Not exercising. No prior TTE. Admits to intermittent LE edema. Tobacco - smoking 4 ciggs/day since 2023. Declines nicotine patches. Not on any inhalers at this time. Sleep - admits to snoring and witnessed apneas. No prior PSG. No morning headaches.

## (undated) DEVICE — DRAPE,HAND,STERILE: Brand: MEDLINE

## (undated) DEVICE — BNDG CMPR ELAS KNT VEL STD 3IN -- MEDICHOICE

## (undated) DEVICE — GAUZE,SPONGE,4"X4",16PLY,STRL,LF,10/TRAY: Brand: MEDLINE

## (undated) DEVICE — PACK PROCEDURE SURG MAJ W/ BASIN LF

## (undated) DEVICE — STRIP SKIN CLSR W0.25XL4IN WHT SPUNBOUND FBR NYL HI ADH

## (undated) DEVICE — 3.5MM X 14MM NON-LOCKING HEXALOBE SCREW
Type: IMPLANTABLE DEVICE | Site: ARM | Status: NON-FUNCTIONAL
Brand: ACUMED
Removed: 2019-09-24

## (undated) DEVICE — (D)PREP SKN CHLRAPRP APPL 26ML -- CONVERT TO ITEM 371833

## (undated) DEVICE — BIT DRL RMFG QC SURGIBIT 2MM --

## (undated) DEVICE — SUTURE MCRYL SZ 3-0 L27IN ABSRB UD L26MM SH 1/2 CIR Y416H

## (undated) DEVICE — KIT CLN UP BON SECOURS MARYV

## (undated) DEVICE — STERILE POLYISOPRENE POWDER-FREE SURGICAL GLOVES: Brand: PROTEXIS

## (undated) DEVICE — .054" X 6" GUIDE WIRE: Brand: ACUMED

## (undated) DEVICE — IMMOB SHLDR W/WAIST STRP L --

## (undated) DEVICE — SPIROMETER INCENT 2500ML W ONE W VLV

## (undated) DEVICE — NEEDLE NRV BLK 21GA L4IN 30DEG INSUL BVL EXTN SET STIMUPLEX

## (undated) DEVICE — TRNQT RMFG CUFF 2P 18IN W/SLV --

## (undated) DEVICE — Device

## (undated) DEVICE — T15 STICK FIT HEXALOBE DRIVER: Brand: ACUMED

## (undated) DEVICE — DRESSING,GAUZE,XEROFORM,CURAD,1"X8",ST: Brand: CURAD

## (undated) DEVICE — BANDAGE COMPR EXSANGUATION SGL LAYERED NO CLSR 9FT LEN 4IN W

## (undated) DEVICE — 1.5MM HEX DRIVER TIP, LOCKING GROOVE: Brand: ACUMED

## (undated) DEVICE — GARMENT,MEDLINE,DVT,INT,CALF,MED, GEN2: Brand: MEDLINE

## (undated) DEVICE — PADDING CAST COHESIVE 4 YDX3 IN HND TEARABLE COTTON SPEC 100

## (undated) DEVICE — GOWN,REINFORCE,POLY,SIRUS,SETSLV,XLARGE: Brand: MEDLINE

## (undated) DEVICE — SUTURE VCRL RAPIDE SZ 4-0 L18IN ABSRB UD L19MM PS-2 1/2 CIR VR496

## (undated) DEVICE — 10FR FRAZIER SUCTION HANDLE: Brand: CARDINAL HEALTH

## (undated) DEVICE — PAD,ABDOMINAL,8"X10",ST,LF: Brand: MEDLINE

## (undated) DEVICE — BIPOLAR FORCEPS CORD: Brand: VALLEYLAB

## (undated) DEVICE — FLEX ADVANTAGE 3000CC: Brand: FLEX ADVANTAGE

## (undated) DEVICE — 2.3MM X 16MM LOCKING CORTICAL SCREW
Type: IMPLANTABLE DEVICE | Site: ARM | Status: NON-FUNCTIONAL
Brand: ACUMED
Removed: 2019-09-24

## (undated) DEVICE — SOLUTION IV 1000ML 0.9% SOD CHL

## (undated) DEVICE — INTENDED FOR TISSUE SEPARATION, AND OTHER PROCEDURES THAT REQUIRE A SHARP SURGICAL BLADE TO PUNCTURE OR CUT.: Brand: BARD-PARKER SAFETY BLADES SIZE 15, STERILE

## (undated) DEVICE — THREE-QUARTER SHEET: Brand: CONVERTORS

## (undated) DEVICE — 3M™ STERI-STRIP™ COMPOUND BENZOIN TINCTURE 40 BAGS/CARTON 4 CARTONS/CASE C1544: Brand: 3M™ STERI-STRIP™

## (undated) DEVICE — STRETCH BANDAGE ROLL: Brand: DERMACEA

## (undated) DEVICE — SPLINT CAST PLASTER 4X15FT -- DYNACAST

## (undated) DEVICE — IMMOBILIZER SHLDR L L18IN D9IN UNIV POLY COT W/ FOAM STRP

## (undated) DEVICE — INTENDED FOR TISSUE SEPARATION, AND OTHER PROCEDURES THAT REQUIRE A SHARP SURGICAL BLADE TO PUNCTURE OR CUT.: Brand: BARD-PARKER SAFETY BLADES SIZE 10, STERILE